# Patient Record
Sex: MALE | Race: WHITE | NOT HISPANIC OR LATINO | Employment: FULL TIME | URBAN - METROPOLITAN AREA
[De-identification: names, ages, dates, MRNs, and addresses within clinical notes are randomized per-mention and may not be internally consistent; named-entity substitution may affect disease eponyms.]

---

## 2017-06-20 ENCOUNTER — GENERIC CONVERSION - ENCOUNTER (OUTPATIENT)
Dept: OTHER | Facility: OTHER | Age: 57
End: 2017-06-20

## 2018-01-13 NOTE — PROGRESS NOTES
Assessment    1  Encounter for preventive health examination (V70 0) (Z00 00)    Plan  Colon cancer screening    · COLONOSCOPY; Status:Active - Retrospective By Protocol Authorization; Requested  ZFR:[id number]; Health Maintenance, Obesity, Screening for prostate cancer    · (1) GLUCOSE,  FASTING; Status:Active; Requested ATX:[id number];    · (1) LIPID PANEL, FASTING; Status:Active; Requested JOSEP:[id number];    · (1) PSA (SCREEN) (Dx V76 44 Screen for Prostate Cancer); Status:Active; Requested  IGB:[id number];    · (1) TSH; Status:Active; Requested RGZ:[id number];     Discussion/Summary  Impression: health maintenance visit  Currently, he eats an adequate diet and has an adequate exercise regimen  Prostate cancer screening: PSA was ordered  Colorectal cancer screening: colorectal cancer screening is current  Screening lab work includes glucose, lipid profile and TSH  The risks and benefits of immunizations were discussed  He was advised to be evaluated by an ophthalmologist  Advice and education were given regarding nutrition, aerobic exercise, weight loss and cardiovascular risk reduction  Repeat labs  he is scheduled for eye exam this month  History of Present Illness  HM, Adult Male: The patient is being seen for a health maintenance evaluation  The last health maintenance visit was >1 year(s) ago  Social History: Household members include spouse, 1 daughter(s) and 1 son(s)  He is   Work status: working full time  The patient has never smoked cigarettes  He reports occasional alcohol use  General Health: The patient's health since the last visit is described as good  He has regular dental visits  He denies vision problems  Vision care includes wearing reading glasses and an eye examination more than a year ago  Immunizations status: up to date  Lifestyle:  He consumes a diverse and healthy diet  He has weight concerns  He exercises regularly  He exercises 3 or more times per week  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, basketball and lacrosse  He does not use tobacco    Screening: Prostate cancer screening includes last prostate-specific antigen testing 03/2014  Colorectal cancer screening includes last colonoscopy done 06/2014  Metabolic screening includes lipid profile performed 12/2012, glucose screening performed 12/2012 and thyroid function test performed 12/2012  Cardiovascular risk factors: obesity  HPI: 48 yo male here for wellness exam  active problems and PMH see chart  medications none  OTCs MVI and ASA  labs 12/2012 profile normal  FBS 96, creatinine 0 85  Electrolytes normal  LFTs normal  Lipid profile cholesterol 188  TGs 48, HDL 70   TSH 2 794  Review of Systems    Constitutional: BMI 31 32, but no recent weight gain and no recent weight loss  Eyes: no eyesight problems  ENT: allergic rhinitis RAST positive to ragweed  methacholine challenge test normal    Cardiovascular: no chest pain, no palpitations and no extremity edema  Respiratory: no shortness of breath during exertion and no PND  Gastrointestinal: no rectal bleeding  GI evaluation for IBS with colonoscopy 03/2005 normal  05/2005 EGD normal  follow up colonoscopy 06/2014 normal  1 hyperplastic polyp, but no abdominal pain, no nausea, no constipation and no diarrhea  Genitourinary: history of abnormal area prostate  prostate biopsy 03/2005 negative  PSA 03/2014 1 2, but no urinary hesitancy and no nocturia  Musculoskeletal: hallus rigidus bilateral-s/p titanium implant left great toe  1998 right ACL and MM tears-treated non surgically  , but no arthralgias and no myalgias  Integumentary: dermatology evaluation 2006 , but no rashes and no skin lesions  Neurological: no headache and no dizziness  Psychiatric: no anxiety and no depression  Active Problems    1  Allergic rhinitis (477 9) (J30 9)   2  Chest pain (786 50) (R07 9)   3   Esophageal reflux (180 81) (K21 9)   4  Irritable bowel syndrome (564 1) (K58 9)   5  Nodular prostate without lower urinary tract symptoms (600 10) (N40 2)    Past Medical History    · History of Acute Medial Meniscus Tear (836 0)   · History of Benign colon polyp (211 3) (K63 5)   · History of Complete Tear Of The Anterior Cruciate Ligament Of The Right Knee (844 2)   · History of gastritis (V12 79) (Z87 19)    Surgical History    · History of Foot Surgery Left    Family History  Father    · Family history of Hypertension (V17 49)    Social History    · Never A Smoker    Current Meds   1  Aspirin 81 MG TABS Recorded   2  Daily Value Multivitamin Oral Tablet Recorded    Allergies    1  No Known Drug Allergies    Vitals   Recorded: [id number] 02:11PM   Temperature 98 4 F   Heart Rate 74   Respiration 16   Systolic 269   Diastolic 80   Height 5 ft 7 in   Weight 200 lb    BMI Calculated 31 32   BSA Calculated 2 02     Physical Exam    Constitutional   General appearance: No acute distress, well appearing and well nourished  Eyes   Conjunctiva and lids: No erythema, swelling or discharge  Pupils and irises: Equal, round, reactive to light  Ophthalmoscopic examination: Normal fundi and optic discs  Ears, Nose, Mouth, and Throat   Otoscopic examination: Tympanic membranes translucent with normal light reflex  Canals patent without erythema  Oropharynx: Normal with no erythema, edema, exudate or lesions  Neck   Neck: Supple, symmetric, trachea midline, no masses  Thyroid: Normal, no thyromegaly  There were no thyroid nodules  Pulmonary   Auscultation of lungs: Clear to auscultation  Cardiovascular   Auscultation of heart: Normal rate and rhythm, normal S1 and S2, no murmurs  Heart sounds: no gallop heard  Carotid pulses: 2+ bilaterally  no bruit heard over the right carotid and no bruit heard over the left carotid  Abdominal aorta: Normal   Abdominal aorta: no bruit heard     Examination of extremities for edema and/or varicosities: Normal     Abdomen   Abdomen: Non-tender, no masses  Liver and spleen: No hepatomegaly or splenomegaly  Lymphatic   Palpation of lymph nodes in neck: No lymphadenopathy  no anterior cervical node enlargement, no posterior cervical node enlargement, no submandibular node enlargement and no supraclavicular node enlargement  Musculoskeletal   Gait and station: Normal     Range of motion: Normal     Muscle strength/tone: Normal     Skin   Skin and subcutaneous tissue: Normal without rashes or lesions  Neurologic   Cranial nerves: Cranial nerves 2-12 intact  Psychiatric   Mood and affect: Normal        Results/Data  (1) PSA (SCREEN) (Dx V76 44 Screen for Prostate Cancer) [id number] 11:36AM Frida Shorten     Test Name Result Flag Reference   PSA 1 2 ng/mL   0-4 0   PSA values from one laboratory may not be comparable to those from  another because of the various testing technologies employed  Additionally, PSA results can not be interpreted as absolute evidence of  the presence or absence of disease  This PSA value was obtained by [employer] Immuno[employer]         Signatures   Electronically signed by : Lucetta Bosworth, M D ; Jun 6 2016  2:43PM EST                       (Author)

## 2018-06-26 RX ORDER — ERGOCALCIFEROL (VITAMIN D2) 10 MCG
1 TABLET ORAL DAILY
COMMUNITY

## 2018-06-27 ENCOUNTER — TELEPHONE (OUTPATIENT)
Dept: FAMILY MEDICINE CLINIC | Facility: CLINIC | Age: 58
End: 2018-06-27

## 2018-06-27 ENCOUNTER — OFFICE VISIT (OUTPATIENT)
Dept: FAMILY MEDICINE CLINIC | Facility: CLINIC | Age: 58
End: 2018-06-27
Payer: COMMERCIAL

## 2018-06-27 VITALS
SYSTOLIC BLOOD PRESSURE: 120 MMHG | HEIGHT: 67 IN | HEART RATE: 64 BPM | TEMPERATURE: 98.7 F | BODY MASS INDEX: 31.71 KG/M2 | WEIGHT: 202 LBS | DIASTOLIC BLOOD PRESSURE: 88 MMHG

## 2018-06-27 DIAGNOSIS — Z12.5 SCREENING FOR PROSTATE CANCER: ICD-10-CM

## 2018-06-27 DIAGNOSIS — Z00.00 WELL ADULT EXAM: Primary | ICD-10-CM

## 2018-06-27 DIAGNOSIS — Z11.59 NEED FOR HEPATITIS C SCREENING TEST: ICD-10-CM

## 2018-06-27 DIAGNOSIS — Z23 NEED FOR SHINGLES VACCINE: ICD-10-CM

## 2018-06-27 DIAGNOSIS — E78.00 HYPERCHOLESTEREMIA: ICD-10-CM

## 2018-06-27 PROCEDURE — 99396 PREV VISIT EST AGE 40-64: CPT | Performed by: FAMILY MEDICINE

## 2018-06-27 NOTE — PROGRESS NOTES
Assessment/Plan:     Diagnoses and all orders for this visit:    Well adult exam    Hypercholesteremia  -     Lipid panel    Screening for prostate cancer  -     PSA, Total Screen; Future    Need for shingles vaccine  -     Zoster Vac Recomb Adjuvanted 50 MCG SUSR; Inject 50 mcg into the shoulder, thigh, or buttocks once for 1 dose    Need for hepatitis C screening test  -     Hepatitis C antibody; Future        Diet, weight loss and exercise  Repeat lipid profile  I included a PSA and Hep C AB  Script for shingles vaccine  Patient ID: Niecy Rea is a 66 y o  male  66year old here for wellness exam  Active problems and PMH see chart  Medications none  OTCs ASA 81 mg daily, MVI and   Glucosamine / Chondroitin  Hyperlipidemia with high HDL cholesterol  06/2016 lipid profile cholesterol 216  Triglycerides 90  HDL 70    FBS 96  TSH 3 210  Physically active  Officiates soccer, basketball and lacrosse    2 children  The following portions of the patient's history were reviewed and updated as appropriate: current medications, past family history, past medical history, past social history, past surgical history and problem list       No Known Allergies    Review of Systems   Constitutional: Negative for appetite change, chills, fever and unexpected weight change  HENT: Negative for congestion, ear pain, rhinorrhea, sore throat and trouble swallowing  Eyes: Negative for visual disturbance  Wears reading glasses  Respiratory: Negative for cough, shortness of breath and wheezing  Cardiovascular: Negative for chest pain, palpitations and leg swelling  Gastrointestinal: Negative for abdominal pain, blood in stool, constipation, diarrhea, nausea and vomiting  GI evaluation for IBS with colonoscopy 03/2005 normal  05/2005 EGD normal  follow up colonoscopy 06/2014 normal  1 hyperplastic polyp  Genitourinary: Negative for difficulty urinating          History of abnormal area prostate  prostate biopsy 03/2005 negative  PSA 03/2014 1 2  06/2016 PSA 1 5   Musculoskeletal: Negative for arthralgias and myalgias  Hallus rigidus bilateral-s/p titanium implant left great toe  1998 right ACL and MM tears-treated non surgically   Skin: Negative for rash  Allergic/Immunologic: Positive for environmental allergies  Allergic rhinitis RAST positive to ragweed  methacholine challenge test normal     Neurological: Negative for dizziness, weakness and headaches  Hematological: Negative for adenopathy  Does not bruise/bleed easily  Psychiatric/Behavioral: Negative for dysphoric mood and sleep disturbance  Objective:      /88 (BP Location: Left arm, Patient Position: Sitting, Cuff Size: Large)   Pulse 64   Temp 98 7 °F (37 1 °C)   Ht 5' 7" (1 702 m)   Wt 91 6 kg (202 lb)   BMI 31 64 kg/m²          Physical Exam   Constitutional: He is oriented to person, place, and time  He appears well-developed and well-nourished  No distress  HENT:   Right Ear: Tympanic membrane normal    Left Ear: Tympanic membrane normal    Mouth/Throat: Oropharynx is clear and moist    Eyes: Conjunctivae and EOM are normal  Pupils are equal, round, and reactive to light  No scleral icterus  Funduscopic exam normal   Neck: Normal range of motion  Neck supple  No JVD present  Carotid bruit is not present  No tracheal deviation present  No thyroid mass and no thyromegaly present  Cardiovascular: Normal rate, regular rhythm, normal heart sounds and intact distal pulses  Exam reveals no gallop  No murmur heard  Pulses:       Carotid pulses are 2+ on the right side, and 2+ on the left side  Pulmonary/Chest: Effort normal and breath sounds normal  No respiratory distress  He has no wheezes  He has no rales  Abdominal: Soft  Bowel sounds are normal  He exhibits no distension and no mass  There is no hepatosplenomegaly  There is no tenderness   There is no rebound and no guarding  Musculoskeletal: Normal range of motion  He exhibits no edema or deformity  Lymphadenopathy:     He has no cervical adenopathy  Neurological: He is alert and oriented to person, place, and time  He has normal reflexes  No cranial nerve deficit  Skin: Skin is warm and dry  No rash noted  Psychiatric: He has a normal mood and affect  His behavior is normal    Nursing note and vitals reviewed

## 2018-09-27 ENCOUNTER — APPOINTMENT (OUTPATIENT)
Dept: LAB | Facility: CLINIC | Age: 58
End: 2018-09-27
Payer: COMMERCIAL

## 2018-09-27 DIAGNOSIS — Z12.5 SCREENING FOR PROSTATE CANCER: ICD-10-CM

## 2018-09-27 DIAGNOSIS — Z11.59 NEED FOR HEPATITIS C SCREENING TEST: ICD-10-CM

## 2018-09-27 LAB
CHOLEST SERPL-MCNC: 182 MG/DL (ref 50–200)
HCV AB SER QL: NORMAL
HDLC SERPL-MCNC: 64 MG/DL (ref 40–60)
LDLC SERPL CALC-MCNC: 102 MG/DL (ref 0–100)
NONHDLC SERPL-MCNC: 118 MG/DL
PSA SERPL-MCNC: 1.6 NG/ML (ref 0–4)
TRIGL SERPL-MCNC: 81 MG/DL

## 2018-09-27 PROCEDURE — 80061 LIPID PANEL: CPT | Performed by: FAMILY MEDICINE

## 2018-09-27 PROCEDURE — G0103 PSA SCREENING: HCPCS

## 2018-09-27 PROCEDURE — 36415 COLL VENOUS BLD VENIPUNCTURE: CPT | Performed by: FAMILY MEDICINE

## 2018-09-27 PROCEDURE — 86803 HEPATITIS C AB TEST: CPT

## 2019-07-10 ENCOUNTER — OFFICE VISIT (OUTPATIENT)
Dept: FAMILY MEDICINE CLINIC | Facility: CLINIC | Age: 59
End: 2019-07-10
Payer: COMMERCIAL

## 2019-07-10 VITALS
DIASTOLIC BLOOD PRESSURE: 84 MMHG | WEIGHT: 204 LBS | SYSTOLIC BLOOD PRESSURE: 118 MMHG | HEART RATE: 66 BPM | TEMPERATURE: 97.7 F | RESPIRATION RATE: 16 BRPM | BODY MASS INDEX: 30.92 KG/M2 | HEIGHT: 68 IN

## 2019-07-10 DIAGNOSIS — Z00.00 ANNUAL PHYSICAL EXAM: Primary | ICD-10-CM

## 2019-07-10 DIAGNOSIS — Z12.11 SCREENING FOR MALIGNANT NEOPLASM OF COLON: ICD-10-CM

## 2019-07-10 PROCEDURE — 99396 PREV VISIT EST AGE 40-64: CPT | Performed by: NURSE PRACTITIONER

## 2019-07-10 NOTE — PATIENT INSTRUCTIONS

## 2019-07-10 NOTE — PROGRESS NOTES
ADULT ANNUAL PHYSICAL  [facility]    NAME: Loren Bosch  AGE: 64 y o  SEX: male  : 1960     DATE: 7/10/2019     Assessment and Plan:     Problem List Items Addressed This Visit     None      Visit Diagnoses     Annual physical exam    -  Primary    Screening for malignant neoplasm of colon        Relevant Orders    Ambulatory referral to Colorectal Surgery          Immunizations and preventive care screenings were discussed with patient today  Appropriate education was printed on patient's after visit summary  Counseling:  BMI Counseling: Body mass index is 30 93 kg/m²  Discussed the patient's BMI with him  The BMI is above average  BMI counseling and education was provided to the patient  Nutrition recommendations include reducing portion sizes, decreasing overall calorie intake, consuming healthier snacks, moderation in carbohydrate intake and reducing intake of saturated fat and trans fat  Exercise recommendations include exercising 3-5 times per week  Alcohol/drug use: discussed moderation in alcohol intake and avoidance of illicit drug use  Dental Health: discussed importance of regular tooth brushing, flossing, and dental visits  Injury prevention: discussed safety/seat belts, safety helmets, smoke detectors, carbon dioxide detectors, and smoking near bedding or upholstery  · Sexual health: discussed sexually transmitted diseases, partner selection, use of condoms, avoidance of unintended pregnancy, and contraceptive alternatives  Return in 1 year (on 7/10/2020)  Chief Complaint:     Chief Complaint   Patient presents with    Well Check      History of Present Illness:     Adult Annual Physical   Patient here for a comprehensive physical exam  The patient reports no problems  Diet and Physical Activity  · Diet/Nutrition: well balanced diet and consuming 3-5 servings of fruits/vegetables daily     · Exercise: 5-7 times a week on average and 30-60 minutes on average  Depression Screening  PHQ-9 Depression Screening    PHQ-9:    Frequency of the following problems over the past two weeks:       Little interest or pleasure in doing things:  0 - not at all  Feeling down, depressed, or hopeless:  0 - not at all  PHQ-2 Score:  0       General Health  · Sleep: sleeps well and gets 7-8 hours of sleep on average  · Hearing: normal - bilateral   · Vision: no vision problems  · Dental: regular dental visits, brushes teeth once daily and flosses teeth occasionally   Health  · Symptoms include: none     Review of Systems:     Review of Systems   Constitutional: Negative  HENT: Negative  Eyes: Negative  Respiratory: Negative  Cardiovascular: Negative  Gastrointestinal: Negative  Endocrine: Negative  Genitourinary: Negative  Musculoskeletal: Negative  Skin: Negative  Allergic/Immunologic: Negative  Neurological: Negative  Hematological: Negative  Psychiatric/Behavioral: Negative         Past Medical History:     Past Medical History:   Diagnosis Date    Benign colon polyp     last assessed 06Jun2016    Complete tear of anterior cruciate ligament of right knee     Gastritis       Past Surgical History:     Past Surgical History:   Procedure Laterality Date    FOOT SURGERY Left       Family History:     Family History   Problem Relation Age of Onset    Hypertension Father       Social History:     Social History     Socioeconomic History    Marital status: /Civil Union     Spouse name: None    Number of children: None    Years of education: None    Highest education level: None   Occupational History    None   Social Needs    Financial resource strain: None    Food insecurity:     Worry: None     Inability: None    Transportation needs:     Medical: None     Non-medical: None   Tobacco Use    Smoking status: Never Smoker    Smokeless tobacco: Never Used   Substance and Sexual Activity    Alcohol use: Yes     Comment: socially    Drug use: Never    Sexual activity: None   Lifestyle    Physical activity:     Days per week: None     Minutes per session: None    Stress: None   Relationships    Social connections:     Talks on phone: None     Gets together: None     Attends [employer] service: None     Active member of club or organization: None     Attends meetings of clubs or organizations: None     Relationship status: None    Intimate partner violence:     Fear of current or ex partner: None     Emotionally abused: None     Physically abused: None     Forced sexual activity: None   Other Topics Concern    None   Social History Narrative    None      Current Medications:     Current Outpatient Medications   Medication Sig Dispense Refill    aspirin 81 MG tablet Take 1 tablet by mouth daily      Multiple Vitamin (DAILY VALUE MULTIVITAMIN) TABS Take 1 tablet by mouth daily       No current facility-administered medications for this visit  Allergies:     No Known Allergies   Physical Exam:     /84   Pulse 66   Temp 97 7 °F (36 5 °C)   Resp 16   Ht 5' 8 1" (1 73 m)   Wt 92 5 kg (204 lb)   BMI 30 93 kg/m² (Reviewed)    Physical Exam   Constitutional: He is oriented to person, place, and time  Vital signs are normal  He appears well-developed and well-nourished  HENT:   Head: Normocephalic and atraumatic  Right Ear: Tympanic membrane, external ear and ear canal normal    Left Ear: Tympanic membrane, external ear and ear canal normal    Nose: Nose normal    Mouth/Throat: Uvula is midline, oropharynx is clear and moist and mucous membranes are normal    Eyes: Pupils are equal, round, and reactive to light  Conjunctivae, EOM and lids are normal    Neck: Trachea normal  Neck supple  Cardiovascular: Normal rate, regular rhythm, normal heart sounds and intact distal pulses  Pulmonary/Chest: Effort normal and breath sounds normal    Abdominal: Soft   Bowel sounds are normal  There is no tenderness  Musculoskeletal: Normal range of motion  Neurological: He is alert and oriented to person, place, and time  He has normal strength and normal reflexes  No cranial nerve deficit or sensory deficit  He displays a negative Romberg sign  Skin: Skin is warm and dry  Capillary refill takes less than 2 seconds  Psychiatric: He has a normal mood and affect   His behavior is normal        Lindsey Araya, [id number] Muriel Joseph

## 2019-10-07 ENCOUNTER — OFFICE VISIT (OUTPATIENT)
Dept: FAMILY MEDICINE CLINIC | Facility: CLINIC | Age: 59
End: 2019-10-07
Payer: COMMERCIAL

## 2019-10-07 VITALS
HEART RATE: 72 BPM | SYSTOLIC BLOOD PRESSURE: 122 MMHG | BODY MASS INDEX: 31.52 KG/M2 | WEIGHT: 208 LBS | DIASTOLIC BLOOD PRESSURE: 70 MMHG | RESPIRATION RATE: 16 BRPM | TEMPERATURE: 97.7 F | HEIGHT: 68 IN

## 2019-10-07 DIAGNOSIS — M54.16 LUMBAR RADICULOPATHY: Primary | ICD-10-CM

## 2019-10-07 PROCEDURE — 3008F BODY MASS INDEX DOCD: CPT | Performed by: FAMILY MEDICINE

## 2019-10-07 PROCEDURE — 99213 OFFICE O/P EST LOW 20 MIN: CPT | Performed by: FAMILY MEDICINE

## 2019-10-07 RX ORDER — METHYLPREDNISOLONE 4 MG/1
TABLET ORAL
Qty: 21 EACH | Refills: 0 | Status: SHIPPED | OUTPATIENT
Start: 2019-10-07 | End: 2020-10-12 | Stop reason: ALTCHOICE

## 2019-10-07 NOTE — PROGRESS NOTES
Assessment/Plan:       Diagnoses and all orders for this visit:    Lumbar radiculopathy  -     methylPREDNISolone 4 MG tablet therapy pack; Use as directed on package  -     Ambulatory referral to Physical Therapy; Future    Other orders  -     Cancel: influenza vaccine, [id number], quadrivalent, recombinant, PF, 0 5 mL, for patients 18 yr+ (FLUBLOK)        Trial of oral steroids  Referral for PT  Advised to call if any changes  Patient ID: Severiano Lazier is a 64 y o  male  1 month history of pain right gluteal area radiating into right lateral calf  Burning sensation right thigh  Numbness and tingling right lateral foot  No leg weakness  No new bowel or bladder changes  Symptoms worse with sitting  He has tried prn Advil for pain  He has had several chiropractic treatments  No prior history of similar problem  The following portions of the patient's history were reviewed and updated as appropriate: allergies, current medications, past family history, past medical history, past social history, past surgical history and problem list     Review of Systems   Constitutional: Negative for appetite change, chills and fever  Gastrointestinal: Negative for abdominal pain, constipation, diarrhea, nausea and vomiting  Genitourinary: Negative for difficulty urinating  Musculoskeletal: Negative for back pain and gait problem  Skin: Negative for rash  Neurological: Negative for weakness and headaches  See HPI          Objective:       /70   Pulse 72   Temp 97 7 °F (36 5 °C)   Resp 16   Ht 5' 8 1" (1 73 m)   Wt 94 3 kg (208 lb)   BMI 31 53 kg/m²          Physical Exam   Constitutional: He is oriented to person, place, and time  No distress  Musculoskeletal: Normal range of motion  He exhibits tenderness  He exhibits no edema  Mild tenderness over right gluteal notch  No lumbar spine or lumbar paraspinal tenderness  No SI tenderness      Neurological: He is alert and oriented to person, place, and time  He has normal strength  No sensory deficit  Gait normal    Reflex Scores:       Patellar reflexes are 2+ on the right side and 2+ on the left side  Achilles reflexes are 0 on the right side and 2+ on the left side    Negative SLR

## 2019-10-15 ENCOUNTER — EVALUATION (OUTPATIENT)
Dept: PHYSICAL THERAPY | Facility: CLINIC | Age: 59
End: 2019-10-15
Payer: COMMERCIAL

## 2019-10-15 DIAGNOSIS — M54.16 LUMBAR RADICULOPATHY: Primary | ICD-10-CM

## 2019-10-15 PROCEDURE — 97161 PT EVAL LOW COMPLEX 20 MIN: CPT | Performed by: PHYSICAL THERAPIST

## 2019-10-15 NOTE — PROGRESS NOTES
PT Evaluation     Today's date: 10/15/2019  Patient name: Nancy Sykes  : 1960  MRN: [id number]  Referring provider: Deni Cortez MD  Dx:   Encounter Diagnosis     ICD-10-CM    1  Lumbar radiculopathy M54 16 Ambulatory referral to Physical Therapy                  Assessment  Assessment details: Nancy ySkes is a 64 y o  male who presents with signs and symptoms consistent with their referring diagnosis of Lumbar radiculopathy  (primary encounter diagnosis)  This patient would benefit from skilled physical therapy to address their listed impairments and functional limitations to maximize functional outcome  Impairments: abnormal or restricted ROM, activity intolerance and lacks appropriate home exercise program     Prognosis: good    Goals  STG Patient is independent with HEP   STG Range of motion is improved by 25% in 2 weeks  LTG Work performance is improved to prior level of function in 4 weeks  LTG ADL performance improved to prior level of function in 4 weeks    Plan  Patient would benefit from: skilled physical therapy  Planned therapy interventions: manual therapy, joint mobilization, abdominal trunk stabilization, flexibility, functional ROM exercises, graded activity, graded exercise, home exercise program and therapeutic exercise  Frequency: 2x week  Duration in visits: 8  Duration in weeks: 4  Treatment plan discussed with: patient        Subjective Evaluation    History of Present Illness  Mechanism of injury: Patient reports onset pulsations in his hamstring and calf on 19  The next day he had stabbing/cramping pain in his right calf that lasted a week  He now has constant partial numbness from the right buttocks down the right side of the leg to the 5th toe  He also has tingling in the left anteromedial lower leg that is intermittent  Symptoms gradually increase with sitting, which he does a lot of at work  He is taking oral steroids which has not helped yet      Pain  At worst pain ratin  Quality: dull ache, radiating and burning    Patient Goals  Patient goals for therapy: decreased pain and independence with ADLs/IADLs          Objective     Palpation   Left   No palpable tenderness to the erector spinae and lumbar paraspinals  Right   No palpable tenderness to the erector spinae and lumbar paraspinals  Neurological Testing     Reflexes   Left   Patellar (L4): absent (0)  Achilles (S1): normal (2+)    Right   Patellar (L4): absent (0)  Achilles (S1): trace (1+)    Active Range of Motion     Lumbar   Flexion: 60 degrees  with pain  Extension: 32 degrees   Left lateral flexion:  WFL  Right lateral flexion:  [facility]    Additional Active Range of Motion Details  Shooting pain down posterolateral leg with standing flexion    Joint Play   Joints within functional limits: L3, L4 and L5     Hypomobile: L1 and L2     Strength/Myotome Testing     Left Ankle/Foot   Plantar flexion: 5    Right Ankle/Foot   Plantar flexion: 5    Tests     Lumbar   Negative SIJ compression and sacroiliac distraction  Left   Negative passive SLR and slump test      Right   Positive slump test    Negative passive SLR and quadrant       General Comments:      Lumbar Comments  Tender to palpation: right piriformis  No tenderness to low back including PA mobs to right L4-5 region           Precautions: hx right knee pain/strain/ACL injury      Manual  10/15            Piriformis DTM SY            Nerve slides, S1                                                        Exercise Diary  10/15            Prone press ups 20            Prone alt arm a and leg             DLS kicks 10 with biofeedback            bridges             Piriformis stretch :20x3            Glut stretch :20x3            Hamstring glides 15            Resisted hip ER supine             Hip add squeeze             Hip flexor stretch             Toe taps             Hamstring/ITB strap stretching supine Modalities

## 2019-10-17 ENCOUNTER — OFFICE VISIT (OUTPATIENT)
Dept: PHYSICAL THERAPY | Facility: CLINIC | Age: 59
End: 2019-10-17
Payer: COMMERCIAL

## 2019-10-17 DIAGNOSIS — M54.16 LUMBAR RADICULOPATHY: Primary | ICD-10-CM

## 2019-10-17 PROCEDURE — 97110 THERAPEUTIC EXERCISES: CPT | Performed by: PHYSICAL THERAPIST

## 2019-10-17 PROCEDURE — 97140 MANUAL THERAPY 1/> REGIONS: CPT | Performed by: PHYSICAL THERAPIST

## 2019-10-17 NOTE — PROGRESS NOTES
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      Objective: See treatment diary below      Assessment: Tolerated treatment well  Patient exhibited good technique with therapeutic exercises and would benefit from continued PT  Tender to right piriformis but otherwise no tenderness to low back, superior gluteals or hamstring  Plan: Continue per plan of care        Precautions: hx right knee pain/strain/ACL injury      Manual  10/15 10/17           Piriformis DTM SY SY           Nerve slides, S1                                                        Exercise Diary  10/15 10/17           Prone press ups 20 20           Prone alt arm a and leg             DLS kicks 10 with biofeedback            bridges  pball 20, legs mostly extended           Piriformis stretch :20x3 :20x3           Glut stretch :20x3 :20x3           Hamstring glides 15 15           Resisted hip ER supine  :05x15           Hip add squeeze  :05x15           Hip flexor stretch  Equal, no stretch           Toe taps  2x20           Hamstring/ITB strap stretching supine  ITB :20x3           Multifidus press    #17 15ea                                                                                                          Modalities

## 2019-10-21 ENCOUNTER — OFFICE VISIT (OUTPATIENT)
Dept: PHYSICAL THERAPY | Facility: CLINIC | Age: 59
End: 2019-10-21
Payer: COMMERCIAL

## 2019-10-21 DIAGNOSIS — M54.16 LUMBAR RADICULOPATHY: Primary | ICD-10-CM

## 2019-10-21 PROCEDURE — 97140 MANUAL THERAPY 1/> REGIONS: CPT

## 2019-10-21 PROCEDURE — 97112 NEUROMUSCULAR REEDUCATION: CPT

## 2019-10-21 PROCEDURE — 97110 THERAPEUTIC EXERCISES: CPT

## 2019-10-21 NOTE — PROGRESS NOTES
Daily Note     Today's date: 10/21/2019  Patient name: Meghan Issa  : 1960  MRN: [id number]  Referring provider: Delmar Martínez MD  Dx:   Encounter Diagnosis     ICD-10-CM    1  Lumbar radiculopathy M54 16                   Subjective: Increased soreness in R gluts after last session, no change in RLE numbness  Objective: See treatment diary below      Assessment: Tolerated treatment well  Minimal tenderness to R piriformis with manual therapy  Challenged by multifidus press on raven though no increase in symptoms throughout session  Patient exhibited good technique with therapeutic exercises and would benefit from continued PT      Plan: Progress treatment as tolerated         Precautions: hx right knee pain/strain/ACL injury      Manual  10/15 10/17 10/21          Piriformis DTM SY SY MC          Nerve slides, S1                                                        Exercise Diary  10/15 10/17 10/21          Prone press ups 20 20 20          Prone alt arm a and leg             DLS kicks 10 with biofeedback            bridges  pball 20, legs mostly extended pball 20, legs mostly extended          Piriformis stretch :20x3 :20x3 :20x4          Glut stretch :20x3 :20x3 :20x4          Hamstring glides 15 15 20          Resisted hip ER supine  :05x15 :05x20          Hip add squeeze  :05x15 :05x20          Hip flexor stretch  Equal, no stretch           Toe taps  2x20           Hamstring/ITB strap stretching supine  ITB :20x3 Both :20x3 ea          Multifidus press    #17 15ea 17# 15 ea                                                                                                         Modalities

## 2019-10-22 ENCOUNTER — APPOINTMENT (OUTPATIENT)
Dept: PHYSICAL THERAPY | Facility: CLINIC | Age: 59
End: 2019-10-22
Payer: COMMERCIAL

## 2019-10-24 ENCOUNTER — OFFICE VISIT (OUTPATIENT)
Dept: PHYSICAL THERAPY | Facility: CLINIC | Age: 59
End: 2019-10-24
Payer: COMMERCIAL

## 2019-10-24 DIAGNOSIS — M54.16 LUMBAR RADICULOPATHY: Primary | ICD-10-CM

## 2019-10-24 PROCEDURE — 97112 NEUROMUSCULAR REEDUCATION: CPT | Performed by: PHYSICAL THERAPIST

## 2019-10-24 PROCEDURE — 97140 MANUAL THERAPY 1/> REGIONS: CPT | Performed by: PHYSICAL THERAPIST

## 2019-10-24 PROCEDURE — 97110 THERAPEUTIC EXERCISES: CPT | Performed by: PHYSICAL THERAPIST

## 2019-10-24 NOTE — PROGRESS NOTES
Daily Note     Today's date: 10/24/2019  Patient name: Selin Foster  : 1960  MRN: [id number]  Referring provider: Holly Aggarwal MD  Dx:   Encounter Diagnosis     ICD-10-CM    1  Lumbar radiculopathy M54 16               Subjective: no change in symptoms with continued numbness and buttock to calf pain      Objective: See treatment diary below      Assessment: Tolerated treatment well  Patient exhibited good technique with therapeutic exercises and would benefit from continued PT  There is a significant difference in tenderness reported in the piriformis muscles > right  He is unable to pull perform the piriformis stretch supine on the right without a towel - no difficulty on the left without towel  Plan: Progress treatment as tolerated         Precautions: hx right knee pain/strain/ACL injury      Manual  10/15 10/17 10/21 10/24         Piriformis DTM, right SY SY MC SY         Nerve slides, S1    SY                                                    Exercise Diary  10/15 10/17 10/21 10/24         Prone press ups 20 20 20          Prone alt arm a and leg             DLS kicks 10 with biofeedback            bridges  pball 20, legs mostly extended pball 20, legs mostly extended          Piriformis stretch :20x3 :20x3 :20x4 :20x4         Glut stretch :20x3 :20x3 :20x4          Hamstring glides 15 15 20 20         Resisted hip ER supine  :05x15 :05x20          Hip add squeeze  :05x15 :05x20          Hip flexor stretch  Equal, no stretch           Toe taps  2x20           Hamstring/ITB strap stretching supine  ITB :20x3 Both :20x3 ea          Multifidus press    #17 15ea 17# 15 ea          Clamshells     :05x15         Right Hip abduction sidelying starting from hip flexion and adduction flexion    20         Slump stretch    :10x10                      Seated piriformis stretch    :20x4                                       Modalities

## 2019-10-29 ENCOUNTER — APPOINTMENT (OUTPATIENT)
Dept: PHYSICAL THERAPY | Facility: CLINIC | Age: 59
End: 2019-10-29
Payer: COMMERCIAL

## 2019-11-13 ENCOUNTER — TELEPHONE (OUTPATIENT)
Dept: FAMILY MEDICINE CLINIC | Facility: CLINIC | Age: 59
End: 2019-11-13

## 2019-11-13 NOTE — TELEPHONE ENCOUNTER
Patient called with update on back pain, tingling & numbness  He completed his 6 weeks of PT and is still experiencing back pain, tingling down the right side & numbness down the left  He would like to go for MRI  Can this be ordered for him?     Betsy Preciado [phone]

## 2019-11-14 DIAGNOSIS — M54.16 LUMBAR RADICULOPATHY: Primary | ICD-10-CM

## 2019-11-15 NOTE — TELEPHONE ENCOUNTER
Gave patient the message  Told him to call back with date and location so we can work on authorization

## 2019-11-15 NOTE — TELEPHONE ENCOUNTER
Patient is scheduled for his MRI on 11/26/19 at [facility]   He will need an insurance authorization

## 2019-11-19 NOTE — TELEPHONE ENCOUNTER
Left message for patient to call back   His MRI has been approved, however he will be receiving a call from [employer] to discuss scheduling  He needs to speak to them and tell them he wants to keep the facility before I will be issued an auth number

## 2019-11-26 ENCOUNTER — HOSPITAL ENCOUNTER (OUTPATIENT)
Dept: MRI IMAGING | Facility: HOSPITAL | Age: 59
Discharge: HOME | End: 2019-11-26
Payer: COMMERCIAL

## 2019-11-26 DIAGNOSIS — M54.16 LUMBAR RADICULOPATHY: ICD-10-CM

## 2019-11-26 PROCEDURE — 72148 MRI LUMBAR SPINE W/O DYE: CPT

## 2019-12-01 ENCOUNTER — TELEPHONE (OUTPATIENT)
Dept: FAMILY MEDICINE CLINIC | Facility: CLINIC | Age: 59
End: 2019-12-01

## 2019-12-01 NOTE — TELEPHONE ENCOUNTER
Call re MRI lumbar spine-MRI shows arthritis lumbar spine  Bulging disc on right at L5-SI suspect this is the cause of his symptoms  There is a herniated disc on the left but his symptoms were in the right leg  Plan-I recommend pain management evaluation     Procedure: Mri Lumbar Spine Wo Contrast    Result Date: 11/29/2019  Narrative: MRI LUMBAR SPINE WITHOUT CONTRAST INDICATION: M54 16: Radiculopathy, lumbar region  COMPARISON:  None  TECHNIQUE:  Sagittal T1, sagittal T2, sagittal inversion recovery, axial T1 and axial T2, coronal T2   IMAGE QUALITY:  Diagnostic FINDINGS: VERTEBRAL BODIES:  There are 5 lumbar type vertebral bodies  There is Modic type II endplate degenerative change at L1-L2  There is no suspicious marrow signal abnormality identified  There is no significant spondylolisthesis  There is mild levo tilt  There is Modic type I endplate degenerative change at L4-L5  SACRUM:  Normal signal within the sacrum  No evidence of insufficiency or stress fracture  There is a sacral Tarlov cyst noted  DISTAL CORD AND CONUS:  Normal size and signal within the distal cord and conus  PARASPINAL SOFT TISSUES:  Paraspinal soft tissues are unremarkable  LOWER THORACIC DISC SPACES:  Normal disc height and signal   No disc herniation, canal stenosis or foraminal narrowing  LUMBAR DISC SPACES:  There is multilevel disc space degeneration  There is disc space narrowing at L1-L2, L4-L5 and L5-S1  L1-L2:  There is a bulging annulus  There is no significant canal stenosis or foraminal narrowing  There is minimal mass effect on the thecal sac  L2-L3:  There is facet arthrosis  There is no significant canal stenosis or foraminal narrowing  L3-L4:  There is bilateral facet arthrosis  There is a synovial cyst along the anteromedial aspect of the right facet joint which results in minimal mass effect on the thecal sac  This measures 4 x 8 mm  There is mild bilateral foraminal narrowing   L4-L5:  There is a bulging annulus, asymmetric to the left  There is a small superimposed central disc protrusion with associated annular fissure  There is facet arthrosis  There is moderate to severe left foraminal narrowing with contact of the exiting left L4 nerve root  There is no significant right foraminal narrowing  L5-S1:  There is a bulging annulus, asymmetric to the right  There is endplate spurring  There is moderate right and mild to moderate left foraminal narrowing  Impression: Multilevel degenerative changes of the lumbar spine, as described above  Note is made of a small synovial cyst along the anteromedial aspect of the right facet joint at L3-L4, which results in minimal mass effect on the thecal sac  Moderate to severe left foraminal narrowing at L4-L5 contacts the exiting left L4 nerve root   Workstation performed: [id number]

## 2019-12-02 DIAGNOSIS — M54.16 LUMBAR RADICULOPATHY: Primary | ICD-10-CM

## 2019-12-02 DIAGNOSIS — R93.89 ABNORMAL MRI: ICD-10-CM

## 2019-12-15 ENCOUNTER — OFFICE VISIT (OUTPATIENT)
Dept: URGENT CARE | Facility: MEDICAL CENTER | Age: 59
End: 2019-12-15
Payer: COMMERCIAL

## 2019-12-15 VITALS
WEIGHT: 208.2 LBS | TEMPERATURE: 97.9 F | DIASTOLIC BLOOD PRESSURE: 94 MMHG | SYSTOLIC BLOOD PRESSURE: 152 MMHG | HEART RATE: 62 BPM | BODY MASS INDEX: 32.68 KG/M2 | OXYGEN SATURATION: 97 % | HEIGHT: 67 IN | RESPIRATION RATE: 18 BRPM

## 2019-12-15 DIAGNOSIS — R10.9 FLANK PAIN: Primary | ICD-10-CM

## 2019-12-15 LAB
SL AMB  POCT GLUCOSE, UA: ABNORMAL
SL AMB LEUKOCYTE ESTERASE,UA: ABNORMAL
SL AMB POCT BILIRUBIN,UA: ABNORMAL
SL AMB POCT BLOOD,UA: ABNORMAL
SL AMB POCT CLARITY,UA: CLEAR
SL AMB POCT COLOR,UA: ABNORMAL
SL AMB POCT KETONES,UA: ABNORMAL
SL AMB POCT NITRITE,UA: ABNORMAL
SL AMB POCT PH,UA: 5
SL AMB POCT SPECIFIC GRAVITY,UA: 1.02
SL AMB POCT URINE PROTEIN: ABNORMAL
SL AMB POCT UROBILINOGEN: 0.2

## 2019-12-15 PROCEDURE — G0382 LEV 3 HOSP TYPE B ED VISIT: HCPCS | Performed by: PHYSICIAN ASSISTANT

## 2019-12-15 PROCEDURE — 87086 URINE CULTURE/COLONY COUNT: CPT | Performed by: PHYSICIAN ASSISTANT

## 2019-12-15 PROCEDURE — 81002 URINALYSIS NONAUTO W/O SCOPE: CPT | Performed by: PHYSICIAN ASSISTANT

## 2019-12-15 RX ORDER — TAMSULOSIN HYDROCHLORIDE 0.4 MG/1
0.4 CAPSULE ORAL
Qty: 3 CAPSULE | Refills: 0 | Status: SHIPPED | OUTPATIENT
Start: 2019-12-15 | End: 2020-12-10

## 2019-12-15 RX ORDER — IBUPROFEN 600 MG/1
600 TABLET ORAL EVERY 8 HOURS PRN
Qty: 15 TABLET | Refills: 0 | Status: SHIPPED | OUTPATIENT
Start: 2019-12-15 | End: 2021-01-19 | Stop reason: ALTCHOICE

## 2019-12-15 NOTE — PROGRESS NOTES
[facility]        NAME: Ace Archer is a 64 y o  male  : 1960    MRN: [id number]  DATE: December 15, 2019  TIME: 10:47 AM    Assessment and Plan   Flank pain [R10 9]  1  Flank pain  POCT urine dip    Urine culture    tamsulosin (FLOMAX) 0 4 mg    ibuprofen (MOTRIN) 600 mg tablet         Patient Instructions     1  Take Motrin 600mg  With food every 8 hours as needed for pain   2  Take Flomax  4mg  Daily  3  Increase fluids  4  Proceed to  ER if symptoms worsen  Chief Complaint     Chief Complaint   Patient presents with    Flank Pain     Pt  C/O right flank pain that began two days ago  Pain went away, but returned yesterday afternoon along with nausea and a fever  History of Present Illness       Mary Luong is a 45-year-old male who presents with a 2 day history of right-sided flank pain  Patient reports symptoms started initially as a dull ache but the pain progressed, he currently rates his pain as a 5 on a scale of 1-10  Patient denies any fever but has had nausea and generalized malaise since the onset of his symptoms, he denies any visible blood in his urine or stone passage  Review of Systems   Review of Systems   Constitutional: Negative  Respiratory: Negative  Cardiovascular: Negative  Gastrointestinal: Negative  Genitourinary: Positive for flank pain  Negative for difficulty urinating, dysuria, frequency and hematuria           Current Medications       Current Outpatient Medications:     aspirin 81 MG tablet, Take 1 tablet by mouth daily, Disp: , Rfl:     Multiple Vitamin (DAILY VALUE MULTIVITAMIN) TABS, Take 1 tablet by mouth daily, Disp: , Rfl:     ibuprofen (MOTRIN) 600 mg tablet, Take 1 tablet (600 mg total) by mouth every 8 (eight) hours as needed for mild pain or moderate pain for up to 5 days, Disp: 15 tablet, Rfl: 0    methylPREDNISolone 4 MG tablet therapy pack, Use as directed on package (Patient not taking: Reported on 12/15/2019), Disp: 21 each, Rfl: 0    tamsulosin (FLOMAX) 0 4 mg, Take 1 capsule (0 4 mg total) by mouth daily with dinner for 3 days, Disp: 3 capsule, Rfl: 0    Current Allergies     Allergies as of 12/15/2019    (No Known Allergies)            The following portions of the patient's history were reviewed and updated as appropriate: allergies, current medications, past family history, past medical history, past social history, past surgical history and problem list      Past Medical History:   Diagnosis Date    Benign colon polyp     last assessed [id number]    Complete tear of anterior cruciate ligament of right knee     Gastritis        Past Surgical History:   Procedure Laterality Date    FOOT SURGERY Left        Family History   Problem Relation Age of Onset    Hypertension Father          Medications have been verified  Objective   /94   Pulse 62   Temp 97 9 °F (36 6 °C) (Temporal)   Resp 18   Ht 5' 7" (1 702 m)   Wt 94 4 kg (208 lb 3 2 oz)   SpO2 97%   BMI 32 61 kg/m²        Physical Exam     Physical Exam   Constitutional: He appears well-developed and well-nourished  No distress  Cardiovascular: Normal rate, regular rhythm and normal heart sounds  No murmur heard  Pulmonary/Chest: Effort normal and breath sounds normal    Abdominal: Soft  Normal appearance and bowel sounds are normal  There is CVA tenderness  There is no rigidity, no rebound and no guarding

## 2019-12-15 NOTE — PATIENT INSTRUCTIONS
1  Take Motrin 600mg  With food every 8 hours as needed for pain   2  Take Flomax  4mg  Daily  3  Increase fluids  4  Proceed to  ER if symptoms worsen

## 2019-12-16 LAB — BACTERIA UR CULT: NORMAL

## 2020-09-26 ENCOUNTER — OFFICE VISIT (OUTPATIENT)
Dept: URGENT CARE | Facility: MEDICAL CENTER | Age: 60
End: 2020-09-26
Payer: COMMERCIAL

## 2020-09-26 VITALS
HEART RATE: 66 BPM | DIASTOLIC BLOOD PRESSURE: 88 MMHG | BODY MASS INDEX: 30.29 KG/M2 | TEMPERATURE: 97.9 F | RESPIRATION RATE: 18 BRPM | OXYGEN SATURATION: 96 % | HEIGHT: 67 IN | WEIGHT: 193 LBS | SYSTOLIC BLOOD PRESSURE: 152 MMHG

## 2020-09-26 DIAGNOSIS — H61.21 IMPACTED CERUMEN OF RIGHT EAR: Primary | ICD-10-CM

## 2020-09-26 DIAGNOSIS — H65.91 FLUID LEVEL BEHIND TYMPANIC MEMBRANE OF RIGHT EAR: ICD-10-CM

## 2020-09-26 PROCEDURE — G0382 LEV 3 HOSP TYPE B ED VISIT: HCPCS | Performed by: PHYSICIAN ASSISTANT

## 2020-09-26 PROCEDURE — 69210 REMOVE IMPACTED EAR WAX UNI: CPT | Performed by: PHYSICIAN ASSISTANT

## 2020-09-26 RX ORDER — PREDNISONE 20 MG/1
20 TABLET ORAL DAILY
Qty: 5 TABLET | Refills: 0 | Status: SHIPPED | OUTPATIENT
Start: 2020-09-26 | End: 2020-10-01

## 2020-09-26 NOTE — PROGRESS NOTES
[facility]        NAME: Dennis Eisenberg is a 64 y o  male  : 1960    MRN: [id number]  DATE: 2020  TIME: 1:52 PM    Assessment and Plan   Impacted cerumen of right ear [H61 21]  1  Impacted cerumen of right ear     2  Fluid level behind tympanic membrane of right ear  predniSONE 20 mg tablet     Patient did have impacted cerumen which was relieved with lavage and instrumentation  This did not relieve his muffled hearing, he does have some fluid behind the eardrum and will treat him with steroids as this is likely the cause of his hearing loss  Patient Instructions   Steroids as directed  Use Debrox daily  Follow up with PCP in 3-5 days  Proceed to  ER if symptoms worsen  Chief Complaint     Chief Complaint   Patient presents with    Ear Fullness     For the past three days  History of Present Illness       Patient is a 82-year-old male who comes in today with bilateral muffled hearing, more so on the right than left  Denies any pain or discharge  No recent swimming  Did try to use Debrox in his ears which did not help much  Review of Systems   Review of Systems   Constitutional: Negative for fever  HENT: Positive for hearing loss  Negative for congestion, ear pain and sore throat  Respiratory: Negative for shortness of breath  Cardiovascular: Negative for chest pain           Current Medications       Current Outpatient Medications:     aspirin 81 MG tablet, Take 1 tablet by mouth daily, Disp: , Rfl:     Multiple Vitamin (DAILY VALUE MULTIVITAMIN) TABS, Take 1 tablet by mouth daily, Disp: , Rfl:     ibuprofen (MOTRIN) 600 mg tablet, Take 1 tablet (600 mg total) by mouth every 8 (eight) hours as needed for mild pain or moderate pain for up to 5 days, Disp: 15 tablet, Rfl: 0    methylPREDNISolone 4 MG tablet therapy pack, Use as directed on package (Patient not taking: Reported on 12/15/2019), Disp: 21 each, Rfl: 0    predniSONE 20 mg tablet, Take 1 tablet (20 mg total) by mouth daily for 5 days, Disp: 5 tablet, Rfl: 0    tamsulosin (FLOMAX) 0 4 mg, Take 1 capsule (0 4 mg total) by mouth daily with dinner for 3 days, Disp: 3 capsule, Rfl: 0    Current Allergies     Allergies as of 09/26/2020    (No Known Allergies)            The following portions of the patient's history were reviewed and updated as appropriate: allergies, current medications, past family history, past medical history, past social history, past surgical history and problem list      Past Medical History:   Diagnosis Date    Benign colon polyp     last assessed [id number]    Complete tear of anterior cruciate ligament of right knee     Gastritis        Past Surgical History:   Procedure Laterality Date    FOOT SURGERY Left        Family History   Problem Relation Age of Onset    Hypertension Father          Medications have been verified  Objective   /88   Pulse 66   Temp 97 9 °F (36 6 °C) (Temporal)   Resp 18   Ht 5' 7" (1 702 m)   Wt 87 5 kg (193 lb)   SpO2 96%   BMI 30 23 kg/m²        Physical Exam     Physical Exam  Constitutional:       General: He is not in acute distress  Appearance: Normal appearance  He is normal weight  HENT:      Right Ear: No swelling  There is impacted cerumen  Left Ear: Tympanic membrane and ear canal normal       Nose: Nose normal       Mouth/Throat:      Mouth: Mucous membranes are moist       Pharynx: Oropharynx is clear  Cardiovascular:      Rate and Rhythm: Normal rate and regular rhythm  Pulmonary:      Effort: Pulmonary effort is normal       Breath sounds: Normal breath sounds  Skin:     General: Skin is warm and dry  Neurological:      Mental Status: He is alert       Ear cerumen removal    Date/Time: 9/26/2020 1:55 PM  Performed by: Bridget Grimaldo PA-C  Authorized by: Bridget Grimaldo PA-C     Patient location:  Clinic  Consent:     Consent obtained:  Verbal    Consent given by:  Patient    Risks discussed:  TM perforation, bleeding and dizziness  Universal protocol:     Patient identity confirmed:  Verbally with patient  Procedure details:     Location:  R ear    Procedure type: irrigation with instrumentation      Instrumentation: curette      Approach:  External    Visualization (free text): Impacted cerumen  Post-procedure details:     Complication:  None    Post-procedure hearing quality: unchanged  Patient tolerance of procedure: Tolerated well, no immediate complications  Comments: There was a significant amount of wax removed from the ear, still no improvement in hearing  There is small effusion noted behind ear  No infection

## 2020-10-12 ENCOUNTER — OFFICE VISIT (OUTPATIENT)
Dept: AUDIOLOGY | Facility: CLINIC | Age: 60
End: 2020-10-12
Payer: COMMERCIAL

## 2020-10-12 ENCOUNTER — OFFICE VISIT (OUTPATIENT)
Dept: OTOLARYNGOLOGY | Facility: CLINIC | Age: 60
End: 2020-10-12
Payer: COMMERCIAL

## 2020-10-12 VITALS — TEMPERATURE: 98.6 F | HEIGHT: 67 IN | WEIGHT: 193 LBS | BODY MASS INDEX: 30.29 KG/M2

## 2020-10-12 DIAGNOSIS — H90.41 SENSORINEURAL HEARING LOSS (SNHL) OF RIGHT EAR WITH UNRESTRICTED HEARING OF LEFT EAR: Primary | ICD-10-CM

## 2020-10-12 DIAGNOSIS — H90.5 SENSORY HEARING LOSS: Primary | ICD-10-CM

## 2020-10-12 PROCEDURE — 92567 TYMPANOMETRY: CPT | Performed by: AUDIOLOGIST

## 2020-10-12 PROCEDURE — 1036F TOBACCO NON-USER: CPT | Performed by: NURSE PRACTITIONER

## 2020-10-12 PROCEDURE — 92557 COMPREHENSIVE HEARING TEST: CPT | Performed by: AUDIOLOGIST

## 2020-10-12 PROCEDURE — 99243 OFF/OP CNSLTJ NEW/EST LOW 30: CPT | Performed by: NURSE PRACTITIONER

## 2020-10-12 RX ORDER — PREDNISONE 20 MG/1
TABLET ORAL
Qty: 42 TABLET | Refills: 0 | Status: SHIPPED | OUTPATIENT
Start: 2020-10-12 | End: 2020-12-10

## 2020-11-24 ENCOUNTER — OFFICE VISIT (OUTPATIENT)
Dept: DERMATOLOGY | Facility: CLINIC | Age: 60
End: 2020-11-24
Payer: COMMERCIAL

## 2020-11-24 VITALS — WEIGHT: 196.6 LBS | TEMPERATURE: 97.4 F | HEIGHT: 67 IN | BODY MASS INDEX: 30.86 KG/M2

## 2020-11-24 DIAGNOSIS — L82.1 SEBORRHEIC KERATOSIS: ICD-10-CM

## 2020-11-24 DIAGNOSIS — D22.9 MULTIPLE MELANOCYTIC NEVI: ICD-10-CM

## 2020-11-24 DIAGNOSIS — D18.01 CHERRY ANGIOMA: ICD-10-CM

## 2020-11-24 DIAGNOSIS — L73.8 SEBACEOUS HYPERPLASIA: Primary | ICD-10-CM

## 2020-11-24 PROCEDURE — 3008F BODY MASS INDEX DOCD: CPT | Performed by: NURSE PRACTITIONER

## 2020-11-24 PROCEDURE — 99204 OFFICE O/P NEW MOD 45 MIN: CPT | Performed by: DERMATOLOGY

## 2020-12-10 ENCOUNTER — OFFICE VISIT (OUTPATIENT)
Dept: FAMILY MEDICINE CLINIC | Facility: CLINIC | Age: 60
End: 2020-12-10
Payer: COMMERCIAL

## 2020-12-10 VITALS
OXYGEN SATURATION: 96 % | HEART RATE: 60 BPM | DIASTOLIC BLOOD PRESSURE: 84 MMHG | SYSTOLIC BLOOD PRESSURE: 130 MMHG | BODY MASS INDEX: 31.08 KG/M2 | TEMPERATURE: 96.1 F | WEIGHT: 198 LBS | HEIGHT: 67 IN

## 2020-12-10 DIAGNOSIS — G56.31 RADIAL NERVE DYSFUNCTION, RIGHT: ICD-10-CM

## 2020-12-10 DIAGNOSIS — Z12.11 COLON CANCER SCREENING: ICD-10-CM

## 2020-12-10 DIAGNOSIS — S64.22XA: Primary | ICD-10-CM

## 2020-12-10 PROCEDURE — 1036F TOBACCO NON-USER: CPT | Performed by: PHYSICIAN ASSISTANT

## 2020-12-10 PROCEDURE — 3008F BODY MASS INDEX DOCD: CPT | Performed by: PHYSICIAN ASSISTANT

## 2020-12-10 PROCEDURE — 3725F SCREEN DEPRESSION PERFORMED: CPT | Performed by: PHYSICIAN ASSISTANT

## 2020-12-10 PROCEDURE — 99214 OFFICE O/P EST MOD 30 MIN: CPT | Performed by: PHYSICIAN ASSISTANT

## 2021-01-18 DIAGNOSIS — Z01.89 LABORATORY PROCEDURE: ICD-10-CM

## 2021-01-18 PROBLEM — R73.03 PREDIABETES: Status: ACTIVE | Noted: 2021-01-18

## 2021-01-18 PROCEDURE — U0003 INFECTIOUS AGENT DETECTION BY NUCLEIC ACID (DNA OR RNA); SEVERE ACUTE RESPIRATORY SYNDROME CORONAVIRUS 2 (SARS-COV-2) (CORONAVIRUS DISEASE [COVID-19]), AMPLIFIED PROBE TECHNIQUE, MAKING USE OF HIGH THROUGHPUT TECHNOLOGIES AS DESCRIBED BY CMS-2020-01-R: HCPCS

## 2021-01-18 PROCEDURE — U0005 INFEC AGEN DETEC AMPLI PROBE: HCPCS

## 2021-01-18 NOTE — PROGRESS NOTES
PRE-OPERATIVE EVALUATION  [facility]    NAME: Ministerio Wheatley  AGE: 64 y o  SEX: male  : 1960     DATE: 2021    Chief Complaint: had concerns including Pre-op Exam      Surgery: detached retina  Anticipated Date of Surgery: 2021  Referring Provider: Dr Lillie Andres at [facility]     HPI  Ministerio Wheatley is a 64 y o  male who presents to the office today for a preoperative consultation at the request of the surgeon for the procedure above  Current anti-platelet/anti-coagulation medications that the patient is prescribed includes: Aspirin  Assessment of Cardiac Risk:  · Denies unstable or severe angina or MI in the last 6 weeks or history of stent placement in the last year   · Denies decompensated heart failure (e g  New onset heart failure, NYHA functional class IV heart failure, or worsening existing heart failure)  · Denies significant arrhythmias such as high grade AV block, symptomatic ventricular arrhythmia, newly recognized ventricular tachycardia, supraventricular tachycardia with resting heart rate >100, or symptomatic bradycardia  · Denies severe heart valve disease including aortic stenosis or symptomatic mitral stenosis     Exercise Capacity:  · Able to walk 4 blocks without symptoms?: Yes  · Able to walk 2 flights without symptoms?: Yes    Prior Anesthesia Reactions: No     Personal history of venous thromboembolic disease? No    History of steroid use for >2 weeks within last year? No      Review of Systems   Constitutional: Negative for chills, fatigue and fever  HENT: Positive for tinnitus  Negative for hearing loss  Eyes: Positive for visual disturbance  Negative for photophobia, pain and redness  Respiratory: Negative for apnea, choking and shortness of breath  Cardiovascular: Negative for chest pain, palpitations and leg swelling  Gastrointestinal: Negative for abdominal pain     Neurological: Negative for dizziness, weakness, light-headedness and headaches         Patient Active Problem List   Diagnosis    Allergic rhinitis    Esophageal reflux    Irritable bowel syndrome    Nodular prostate without lower urinary tract symptoms    Obesity    Right-sided sensorineural hearing loss    Prediabetes       No Known Allergies      Current Outpatient Medications:     aspirin 81 MG tablet, Take 1 tablet by mouth daily, Disp: , Rfl:     Multiple Vitamin (DAILY VALUE MULTIVITAMIN) TABS, Take 1 tablet by mouth daily, Disp: , Rfl:       Past Medical History:   Diagnosis Date    Benign colon polyp     last assessed 06Jun2016    Complete tear of anterior cruciate ligament of right knee     Gastritis         Past Surgical History:   Procedure Laterality Date    FOOT SURGERY Left         Family History   Problem Relation Age of Onset    Hypertension Father         Social History     Socioeconomic History    Marital status: /Civil Union     Spouse name: Not on file    Number of children: Not on file    Years of education: Not on file    Highest education level: Not on file   Occupational History    Not on file   Social Needs    Financial resource strain: Not on file    Food insecurity     Worry: Not on file     Inability: Not on file   [employer] needs     Medical: Not on file     Non-medical: Not on file   Tobacco Use    Smoking status: Never Smoker    Smokeless tobacco: Never Used   Substance and Sexual Activity    Alcohol use: Yes     Comment: socially    Drug use: Never    Sexual activity: Not on file   Lifestyle    Physical activity     Days per week: Not on file     Minutes per session: Not on file    Stress: Not on file   Relationships    Social connections     Talks on phone: Not on file     Gets together: Not on file     Attends Latter-day service: Not on file     Active member of club or organization: Not on file     Attends meetings of clubs or organizations: Not on file     Relationship status: Not on file    Intimate partner violence     Fear of current or ex partner: Not on file     Emotionally abused: Not on file     Physically abused: Not on file     Forced sexual activity: Not on file   Other Topics Concern    Not on file   Social History Narrative    Not on file        /90   Pulse 74   Temp (!) 97 2 °F (36 2 °C)   Resp 14   Ht 5' 7" (1 702 m)   Wt 89 8 kg (198 lb)   BMI 31 01 kg/m²      BP Readings from Last 3 Encounters:   01/19/21 142/90   12/10/20 130/84   09/26/20 152/88     Physical Exam  Constitutional:       General: He is not in acute distress  Appearance: Normal appearance  He is obese  He is not ill-appearing or diaphoretic  HENT:      Head: Normocephalic and atraumatic  Right Ear: External ear normal       Left Ear: External ear normal       Nose: Nose normal    Eyes:      Extraocular Movements: Extraocular movements intact  Conjunctiva/sclera: Conjunctivae normal    Neck:      Musculoskeletal: Normal range of motion and neck supple  No neck rigidity  Vascular: No carotid bruit  Cardiovascular:      Rate and Rhythm: Normal rate and regular rhythm  Pulses: Normal pulses  Heart sounds: Normal heart sounds  No murmur  No friction rub  No gallop  Pulmonary:      Effort: Pulmonary effort is normal  No respiratory distress  Breath sounds: Normal breath sounds  Abdominal:      General: There is no distension  Musculoskeletal: Normal range of motion  Right lower leg: No edema  Left lower leg: No edema  Lymphadenopathy:      Cervical: No cervical adenopathy  Skin:     Findings: No erythema or rash  Neurological:      General: No focal deficit present  Mental Status: He is alert        Gait: Gait normal    Psychiatric:         Mood and Affect: Mood normal          Behavior: Behavior normal           Pre-operative work-up    Laboratory Results: I have personally reviewed the pertinent laboratory results/reports   Last Cr in February 2029 0 87 GFR 96    Lab Results   Component Value Date    HGBA1C 5 7 (H) 02/28/2019     Lab Results   Component Value Date    CHOLESTEROL 182 09/27/2018    CHOLESTEROL 216 (H) 06/08/2016     Lab Results   Component Value Date    HDL 64 (H) 09/27/2018    HDL 70 (H) 06/08/2016     Lab Results   Component Value Date    TRIG 81 09/27/2018    TRIG 90 06/08/2016     Lab Results   Component Value Date    NONHDLC 118 09/27/2018     Lab Results   Component Value Date    LDLCALC 102 (H) 09/27/2018       EKG: sinus bradycardia, normal intervals, no ST segment elevations or depressions          Pre-Op Evaluation Assessment  Cardiac Risk Estimation: per the Revised Cardiac Risk Index (Circ  100:1043, 1999), the patient's risk factors for cardiac complications include none, putting him in: RCI RISK CLASS I (0 risk factors, risk of major cardiac compl  appr  0 5%)  Anthony Wing is undergoing an elective Minimal risk surgery  They are at [address] for major adverse cardiac event (MACE)  They may proceed with surgery as planned without further workup  Pre-Op Evaluation Plan  1  Further preoperative workup as follows:   - None; no further preoperative work-up is required    2  Medication Management/Recommendations:   - Patient has been instructed to avoid herbs or non-directed vitamins the week prior to surgery to ensure no drug interactions with perioperative surgical and anesthetic medications  - Regarding anti-platelet agents: hold aspirin  3  Patient requires further consultation with: None    4   Assessment of Chronic Conditions:  Problem List Items Addressed This Visit        Other    Obesity    Prediabetes      Other Visit Diagnoses     Pre-operative clearance    -  Primary    Relevant Orders    POCT ECG (Completed)    Dyslipidemia        Elevated blood-pressure reading, without diagnosis of hypertension            No further workup needed at this time for low risk procedure on a patient without significant cardiovascular risk factors  Normal EKG today  Blood pressure mildly elevated however patient asymptomatic  He is due for physical for follow-up of elevated blood pressure, replete labs to evaluate for prediabetes and hyperlipidemia  Pre-op form completed and faxed to referring physician noted above      Omari Peck MD  [facility]  [address] [place] [id number]  Phone#  [phone]  Fax#  [phone]

## 2021-01-19 ENCOUNTER — OFFICE VISIT (OUTPATIENT)
Dept: FAMILY MEDICINE CLINIC | Facility: CLINIC | Age: 61
End: 2021-01-19
Payer: COMMERCIAL

## 2021-01-19 ENCOUNTER — APPOINTMENT (OUTPATIENT)
Dept: LAB | Age: 61
End: 2021-01-19
Payer: COMMERCIAL

## 2021-01-19 VITALS
SYSTOLIC BLOOD PRESSURE: 142 MMHG | HEIGHT: 67 IN | RESPIRATION RATE: 14 BRPM | DIASTOLIC BLOOD PRESSURE: 90 MMHG | HEART RATE: 74 BPM | BODY MASS INDEX: 31.08 KG/M2 | TEMPERATURE: 97.2 F | WEIGHT: 198 LBS

## 2021-01-19 DIAGNOSIS — R03.0 ELEVATED BLOOD-PRESSURE READING, WITHOUT DIAGNOSIS OF HYPERTENSION: ICD-10-CM

## 2021-01-19 DIAGNOSIS — R73.03 PREDIABETES: ICD-10-CM

## 2021-01-19 DIAGNOSIS — Z01.818 PRE-OPERATIVE CLEARANCE: Primary | ICD-10-CM

## 2021-01-19 DIAGNOSIS — H90.3 ASYMMETRIC SNHL (SENSORINEURAL HEARING LOSS): ICD-10-CM

## 2021-01-19 DIAGNOSIS — E66.09 CLASS 1 OBESITY DUE TO EXCESS CALORIES WITHOUT SERIOUS COMORBIDITY WITH BODY MASS INDEX (BMI) OF 31.0 TO 31.9 IN ADULT: ICD-10-CM

## 2021-01-19 DIAGNOSIS — E78.5 DYSLIPIDEMIA: ICD-10-CM

## 2021-01-19 LAB — SARS-COV-2 RNA RESP QL NAA+PROBE: NEGATIVE

## 2021-01-19 PROCEDURE — 99214 OFFICE O/P EST MOD 30 MIN: CPT | Performed by: FAMILY MEDICINE

## 2021-01-19 PROCEDURE — 36415 COLL VENOUS BLD VENIPUNCTURE: CPT

## 2021-01-19 PROCEDURE — 86618 LYME DISEASE ANTIBODY: CPT

## 2021-01-19 PROCEDURE — 93000 ELECTROCARDIOGRAM COMPLETE: CPT | Performed by: FAMILY MEDICINE

## 2021-01-19 PROCEDURE — 1036F TOBACCO NON-USER: CPT | Performed by: FAMILY MEDICINE

## 2021-01-19 PROCEDURE — 3008F BODY MASS INDEX DOCD: CPT | Performed by: FAMILY MEDICINE

## 2021-01-20 LAB — B BURGDOR IGG+IGM SER-ACNC: 40

## 2021-05-06 ENCOUNTER — IMMUNIZATIONS (OUTPATIENT)
Dept: FAMILY MEDICINE CLINIC | Facility: HOSPITAL | Age: 61
End: 2021-05-06

## 2021-05-06 DIAGNOSIS — Z23 ENCOUNTER FOR IMMUNIZATION: Primary | ICD-10-CM

## 2021-05-06 PROCEDURE — 0001A SARS-COV-2 / COVID-19 MRNA VACCINE (PFIZER-BIONTECH) 30 MCG: CPT

## 2021-05-06 PROCEDURE — 91300 SARS-COV-2 / COVID-19 MRNA VACCINE (PFIZER-BIONTECH) 30 MCG: CPT

## 2021-05-12 ENCOUNTER — OFFICE VISIT (OUTPATIENT)
Dept: FAMILY MEDICINE CLINIC | Facility: CLINIC | Age: 61
End: 2021-05-12
Payer: COMMERCIAL

## 2021-05-12 ENCOUNTER — TELEPHONE (OUTPATIENT)
Dept: ADMINISTRATIVE | Facility: OTHER | Age: 61
End: 2021-05-12

## 2021-05-12 VITALS
HEART RATE: 64 BPM | TEMPERATURE: 97.5 F | WEIGHT: 187 LBS | RESPIRATION RATE: 16 BRPM | BODY MASS INDEX: 28.34 KG/M2 | SYSTOLIC BLOOD PRESSURE: 132 MMHG | DIASTOLIC BLOOD PRESSURE: 76 MMHG | HEIGHT: 68 IN

## 2021-05-12 DIAGNOSIS — Z00.00 WELL ADULT EXAM: Primary | ICD-10-CM

## 2021-05-12 PROCEDURE — 3008F BODY MASS INDEX DOCD: CPT | Performed by: FAMILY MEDICINE

## 2021-05-12 PROCEDURE — 1036F TOBACCO NON-USER: CPT | Performed by: FAMILY MEDICINE

## 2021-05-12 PROCEDURE — 99396 PREV VISIT EST AGE 40-64: CPT | Performed by: FAMILY MEDICINE

## 2021-05-12 NOTE — TELEPHONE ENCOUNTER
Upon review of the In Basket request we were able to note that no further action is required  The patient chart is up to date  Any additional questions or concerns should be emailed to the Practice Liaisons via [email or web address] email, please do not reply via In Basket      Thank you  Cinthia Gonzalez

## 2021-05-12 NOTE — PROGRESS NOTES
Assessment/Plan:     Diagnoses and all orders for this visit:    Well adult exam        Patient had labs done recently through his employer- obtain copy for chart  He has received his 1st COVID-19 vaccine  BMI Counseling: Body mass index is 28 43 kg/m²  The BMI is above normal  Nutrition recommendations include reducing portion sizes, decreasing overall calorie intake, consuming healthier snacks, moderation in carbohydrate intake, reducing intake of saturated fat and trans fat and reducing intake of cholesterol  Exercise recommendations include exercising 3-5 times per week  Patient ID: Nena Ortega is a 64 y o  male  64year old here for wellness exam   Medications none  OTCs ASA 81 mg daily, MVI and Metamucil  Hospitalizations/surgeries  Left foot surgery  Surgery for detached retina OD  Family history hypertension father  Hyperlipidemia with high HDL Lipid profile 02/2019  cholesterol 196  Triglycerides 91  HDL 60    A1c 5 7  06/2016 TSH 3 210  Physically active  Officiates lacrosse    2 children  The following portions of the patient's history were reviewed and updated as appropriate: allergies, current medications, past family history, past medical history, past social history, past surgical history and problem list     Review of Systems   Constitutional: Positive for unexpected weight change (11 lb weight loss from 01/2021 with diet and exercise)  Negative for appetite change, chills and fever  HENT: Positive for hearing loss  Negative for congestion, ear pain, rhinorrhea, sore throat and trouble swallowing  12/2020 ENT evaluation for low frequency sensorineural hearing loss right greater than left  MRI old brain showed no structural abnormalities, acoustic neuroma  Lyme titer negative  Eyes: Positive for visual disturbance  01/2021 Surgery for detached retina right eye    He is scheduled for cataract surgery right eye this month   Respiratory: Negative for cough, shortness of breath and wheezing  Cardiovascular: Negative for chest pain, palpitations and leg swelling  Gastrointestinal: Negative for abdominal pain, blood in stool, constipation, diarrhea, nausea and vomiting         12/2020 Cologuard negative  09/2018  hepatitis C antibody negative   Endocrine: Negative for polydipsia and polyuria  Genitourinary: Negative for difficulty urinating  Lab Results       Component                Value               Date                       PSA                      1 6                 09/27/2018                 PSA                      1 5                 06/08/2016                 PSA                      1 2                 03/27/2014                 Musculoskeletal: Negative for arthralgias and myalgias  Skin: Negative for rash  11/2020 Dermatology evaluation    Allergic/Immunologic: Negative for environmental allergies  Neurological: Negative for dizziness and headaches  Hematological: Negative for adenopathy  Does not bruise/bleed easily  Psychiatric/Behavioral: Negative for dysphoric mood and sleep disturbance  Objective:    /76   Pulse 64   Temp 97 5 °F (36 4 °C)   Resp 16   Ht 5' 8" (1 727 m)   Wt 84 8 kg (187 lb)   BMI 28 43 kg/m²     BP Readings from Last 3 Encounters:   05/12/21 132/76   01/19/21 142/90   12/10/20 130/84     Wt Readings from Last 3 Encounters:   05/12/21 84 8 kg (187 lb)   01/19/21 89 8 kg (198 lb)   01/19/21 89 8 kg (198 lb)          Physical Exam  Vitals signs and nursing note reviewed  Constitutional:       General: He is not in acute distress  Appearance: He is well-developed  HENT:      Right Ear: Tympanic membrane and ear canal normal       Left Ear: Tympanic membrane and ear canal normal    Eyes:      General: No scleral icterus  Extraocular Movements: Extraocular movements intact        Conjunctiva/sclera: Conjunctivae normal       Pupils: Pupils are equal, round, and reactive to light  Comments: Cataract OD   Neck:      Thyroid: No thyroid mass or thyromegaly  Vascular: No carotid bruit or JVD  Trachea: No tracheal deviation  Cardiovascular:      Rate and Rhythm: Normal rate and regular rhythm  Pulses:           Carotid pulses are 2+ on the right side and 2+ on the left side  Heart sounds: Normal heart sounds  No murmur  No gallop  Pulmonary:      Effort: Pulmonary effort is normal  No respiratory distress  Breath sounds: Normal breath sounds  No wheezing or rales  Abdominal:      General: Bowel sounds are normal  There is no distension or abdominal bruit  Palpations: Abdomen is soft  There is no hepatomegaly, splenomegaly or mass  Tenderness: There is no abdominal tenderness  There is no guarding or rebound  Musculoskeletal:      Right lower leg: No edema  Left lower leg: No edema  Lymphadenopathy:      Cervical: No cervical adenopathy  Upper Body:      Right upper body: No supraclavicular adenopathy  Left upper body: No supraclavicular adenopathy  Skin:     Findings: No rash  Nails: There is no clubbing  Neurological:      General: No focal deficit present  Mental Status: He is alert and oriented to person, place, and time     Psychiatric:         Mood and Affect: Mood normal          Behavior: Behavior normal

## 2021-05-12 NOTE — TELEPHONE ENCOUNTER
----- Message from Phil Pulido sent at 5/11/2021 12:19 PM EDT -----  Regarding: Tanya  05/11/21 12:20 PM    Hello, our patient Farrah Solis has had Cologuard completed/performed   Please assist in updating the patient chart by  reviewing results in [facility] date of service is 12/31/2020    Thank you,  Phil SANDERSON

## 2021-05-29 ENCOUNTER — IMMUNIZATIONS (OUTPATIENT)
Dept: FAMILY MEDICINE CLINIC | Facility: HOSPITAL | Age: 61
End: 2021-05-29

## 2021-05-29 DIAGNOSIS — Z23 ENCOUNTER FOR IMMUNIZATION: Primary | ICD-10-CM

## 2021-05-29 PROCEDURE — 0002A SARS-COV-2 / COVID-19 MRNA VACCINE (PFIZER-BIONTECH) 30 MCG: CPT

## 2021-05-29 PROCEDURE — 91300 SARS-COV-2 / COVID-19 MRNA VACCINE (PFIZER-BIONTECH) 30 MCG: CPT

## 2021-12-22 ENCOUNTER — CLINICAL SUPPORT (OUTPATIENT)
Dept: FAMILY MEDICINE CLINIC | Facility: CLINIC | Age: 61
End: 2021-12-22
Payer: COMMERCIAL

## 2021-12-22 ENCOUNTER — TELEPHONE (OUTPATIENT)
Dept: FAMILY MEDICINE CLINIC | Facility: CLINIC | Age: 61
End: 2021-12-22

## 2021-12-22 DIAGNOSIS — Z23 ENCOUNTER FOR IMMUNIZATION: Primary | ICD-10-CM

## 2021-12-22 PROCEDURE — 90750 HZV VACC RECOMBINANT IM: CPT

## 2021-12-22 PROCEDURE — 90471 IMMUNIZATION ADMIN: CPT

## 2022-01-18 ENCOUNTER — APPOINTMENT (OUTPATIENT)
Dept: LAB | Facility: MEDICAL CENTER | Age: 62
End: 2022-01-18
Payer: COMMERCIAL

## 2022-01-18 DIAGNOSIS — Z12.5 SCREENING FOR PROSTATE CANCER: ICD-10-CM

## 2022-01-18 PROCEDURE — 84153 ASSAY OF PSA TOTAL: CPT

## 2022-01-19 LAB — PSA SERPL-MCNC: 1.3 NG/ML (ref 0–4)

## 2022-02-24 ENCOUNTER — CLINICAL SUPPORT (OUTPATIENT)
Dept: FAMILY MEDICINE CLINIC | Facility: CLINIC | Age: 62
End: 2022-02-24
Payer: COMMERCIAL

## 2022-02-24 DIAGNOSIS — Z23 ENCOUNTER FOR IMMUNIZATION: Primary | ICD-10-CM

## 2022-02-24 PROCEDURE — 90750 HZV VACC RECOMBINANT IM: CPT

## 2022-02-24 PROCEDURE — 90471 IMMUNIZATION ADMIN: CPT

## 2022-05-31 ENCOUNTER — OFFICE VISIT (OUTPATIENT)
Dept: FAMILY MEDICINE CLINIC | Facility: CLINIC | Age: 62
End: 2022-05-31
Payer: COMMERCIAL

## 2022-05-31 VITALS
BODY MASS INDEX: 29.55 KG/M2 | WEIGHT: 195 LBS | DIASTOLIC BLOOD PRESSURE: 78 MMHG | SYSTOLIC BLOOD PRESSURE: 118 MMHG | HEART RATE: 76 BPM | RESPIRATION RATE: 16 BRPM | HEIGHT: 68 IN | TEMPERATURE: 98.2 F

## 2022-05-31 DIAGNOSIS — Z00.00 WELL ADULT EXAM: Primary | ICD-10-CM

## 2022-05-31 PROBLEM — R73.03 PREDIABETES: Status: RESOLVED | Noted: 2021-01-18 | Resolved: 2022-05-31

## 2022-05-31 PROCEDURE — 1036F TOBACCO NON-USER: CPT | Performed by: FAMILY MEDICINE

## 2022-05-31 PROCEDURE — 3008F BODY MASS INDEX DOCD: CPT | Performed by: FAMILY MEDICINE

## 2022-05-31 PROCEDURE — 3725F SCREEN DEPRESSION PERFORMED: CPT | Performed by: FAMILY MEDICINE

## 2022-05-31 PROCEDURE — 99396 PREV VISIT EST AGE 40-64: CPT | Performed by: FAMILY MEDICINE

## 2022-05-31 NOTE — PROGRESS NOTES
Assessment/Plan:         Diagnoses and all orders for this visit:    Well adult exam          OV 1 year     BMI Counseling: Body mass index is 29 65 kg/m²  The BMI is above normal  Nutrition recommendations include reducing portion sizes, decreasing overall calorie intake, consuming healthier snacks, moderation in carbohydrate intake, reducing intake of saturated fat and trans fat and reducing intake of cholesterol  Exercise recommendations include exercising 3-5 times per week  Patient ID: Rocklin Cushing is a 63 y o  male  63year old here for Wellness exam   Medications reviewed  Hospitalizations/surgeries  Hospitalizations/surgeries/SH/FH reviewed see note  Hyperlipidemia with high HDL Lipid profile 04/2022 cholesterol 191  Triglycerides 77  HDL 85  LDL 89  A1c 5 4 decreased from 5 7  06/2016 TSH 3 210  Physically active  Officiates lacrosse/basketball/soccer  No Known Allergies       Current Outpatient Medications:     aspirin 81 MG tablet, Take 1 tablet by mouth daily, Disp: , Rfl:     Multiple Vitamin (DAILY VALUE MULTIVITAMIN) TABS, Take 1 tablet by mouth daily, Disp: , Rfl:      Social History     Tobacco Use    Smoking status: Never Smoker    Smokeless tobacco: Never Used   Vaping Use    Vaping Use: Never used   Substance Use Topics    Alcohol use: Yes     Comment: socially    Drug use: Never     Family History   Problem Relation Age of Onset    Hypertension Father      Past Surgical History:   Procedure Laterality Date    EYE SURGERY Right     detached retina OD, cataract surgery OD    FOOT SURGERY Left          The following portions of the patient's history were reviewed and updated as appropriate: allergies, current medications, past family history, past medical history, past social history, past surgical history and problem list     Review of Systems   Constitutional: Negative for appetite change, chills, fever and unexpected weight change  HENT: Positive for hearing loss  Negative for congestion, ear pain, rhinorrhea, sore throat and trouble swallowing  12/2020 ENT evaluation for low frequency sensorineural hearing loss right greater than left  MRI old brain showed no structural abnormalities, acoustic neuroma  Lyme titer negative  Eyes: Negative for visual disturbance  01/2021 Surgery for detached retina right eye  Cataract surgery right eye 05/2021   Respiratory: Negative for cough, shortness of breath and wheezing  Cardiovascular: Negative for chest pain, palpitations and leg swelling  Gastrointestinal: Negative for abdominal pain, blood in stool, constipation, diarrhea, nausea and vomiting         12/2020 Cologuard negative  09/2018  hepatitis C antibody negative   Endocrine: Negative for polydipsia and polyuria  Genitourinary: Negative for difficulty urinating  Lab Results       Component                Value               Date                       PSA                      1 3                 01/18/2022                 PSA                      1 6                 09/27/2018                 PSA                      1 5                 06/08/2016                            Musculoskeletal: Negative for arthralgias and myalgias  Skin: Negative for rash  11/2020 Dermatology evaluation    Allergic/Immunologic: Negative for environmental allergies  Neurological: Negative for dizziness and headaches  Hematological: Negative for adenopathy  Does not bruise/bleed easily  Psychiatric/Behavioral: Negative for dysphoric mood and sleep disturbance           Objective:      /78   Pulse 76   Temp 98 2 °F (36 8 °C)   Resp 16   Ht 5' 8" (1 727 m)   Wt 88 5 kg (195 lb)   BMI 29 65 kg/m²       BP Readings from Last 3 Encounters:   05/31/22 118/78   05/12/21 132/76   01/19/21 142/90     Wt Readings from Last 3 Encounters:   05/31/22 88 5 kg (195 lb)   05/12/21 84 8 kg (187 lb)   01/19/21 89 8 kg (198 lb)          Physical Exam  Vitals and nursing note reviewed  Constitutional:       General: He is not in acute distress  Appearance: He is well-developed  HENT:      Right Ear: Tympanic membrane normal       Left Ear: Tympanic membrane normal    Eyes:      General: No scleral icterus  Extraocular Movements: Extraocular movements intact  Conjunctiva/sclera: Conjunctivae normal       Pupils: Pupils are equal, round, and reactive to light  Neck:      Thyroid: No thyroid mass or thyromegaly  Vascular: No carotid bruit or JVD  Trachea: No tracheal deviation  Cardiovascular:      Rate and Rhythm: Normal rate and regular rhythm  Pulses:           Carotid pulses are 2+ on the right side and 2+ on the left side  Heart sounds: Normal heart sounds  No murmur heard  No gallop  Pulmonary:      Effort: Pulmonary effort is normal  No respiratory distress  Breath sounds: Normal breath sounds  No wheezing or rales  Chest:   Breasts:      Right: No supraclavicular adenopathy  Left: No supraclavicular adenopathy  Abdominal:      General: Bowel sounds are normal  There is no distension or abdominal bruit  Palpations: Abdomen is soft  There is no hepatomegaly, splenomegaly or mass  Tenderness: There is no abdominal tenderness  There is no guarding or rebound  Musculoskeletal:      Right lower leg: No edema  Left lower leg: No edema  Lymphadenopathy:      Cervical: No cervical adenopathy  Upper Body:      Right upper body: No supraclavicular adenopathy  Left upper body: No supraclavicular adenopathy  Skin:     Findings: No rash  Nails: There is no clubbing  Neurological:      General: No focal deficit present  Mental Status: He is alert and oriented to person, place, and time     Psychiatric:         Mood and Affect: Mood normal

## 2022-06-01 ENCOUNTER — TELEPHONE (OUTPATIENT)
Dept: ADMINISTRATIVE | Facility: OTHER | Age: 62
End: 2022-06-01

## 2022-06-01 NOTE — TELEPHONE ENCOUNTER
----- Message from Nima Bonilla sent at 5/31/2022  3:22 PM EDT -----  Regarding: Grant Dill  05/31/22 3:22 PM    Hello, our patient Cocke Obernburg has had a Colguard completed/performed   Please assist in updating the patient chart by reviewing results in lab portion of EPIC The date of service is 12/22/2020    Thank you,  Nima SANDERSON

## 2022-06-03 NOTE — TELEPHONE ENCOUNTER
Upon review of the In Basket request we 12-22-20 cologuard in [facility] will change to 3 years  Any additional questions or concerns should be emailed to the Practice Liaisons via [email or web address] email, please do not reply via In Basket      Thank you  Diana De Los Santos MA

## 2023-06-08 ENCOUNTER — OFFICE VISIT (OUTPATIENT)
Dept: FAMILY MEDICINE CLINIC | Facility: CLINIC | Age: 63
End: 2023-06-08
Payer: COMMERCIAL

## 2023-06-08 VITALS
OXYGEN SATURATION: 98 % | HEIGHT: 68 IN | SYSTOLIC BLOOD PRESSURE: 120 MMHG | WEIGHT: 198 LBS | BODY MASS INDEX: 30.01 KG/M2 | HEART RATE: 58 BPM | TEMPERATURE: 97.7 F | DIASTOLIC BLOOD PRESSURE: 86 MMHG

## 2023-06-08 DIAGNOSIS — Z00.00 WELL ADULT EXAM: Primary | ICD-10-CM

## 2023-06-08 DIAGNOSIS — M54.12 CERVICAL RADICULOPATHY: ICD-10-CM

## 2023-06-08 DIAGNOSIS — Z12.5 SCREENING FOR PROSTATE CANCER: ICD-10-CM

## 2023-06-08 DIAGNOSIS — Z12.11 SCREENING FOR COLON CANCER: ICD-10-CM

## 2023-06-08 PROCEDURE — 99396 PREV VISIT EST AGE 40-64: CPT | Performed by: FAMILY MEDICINE

## 2023-06-08 RX ORDER — TRIAMCINOLONE ACETONIDE 1 MG/G
CREAM TOPICAL
COMMUNITY
Start: 2023-03-21

## 2023-06-08 NOTE — PROGRESS NOTES
Name: Alyce Rosas      : 1960      MRN: [id number]  Encounter Provider: Luca Aburto MD  Encounter Date: 2023   Encounter department: [address] [place] [address]     1  Well adult exam    2  Cervical radiculopathy  -     XR spine cervical complete 4 or 5 vw non injury; Future; Expected date: 2023    3  Screening for prostate cancer  -     PSA, Total Screen; Future    4  Screening for colon cancer  -     Cologuard    Labs PSA  Cervical spine x-rays  Consider trial of physical therapy  BMI Counseling: Body mass index is 30 11 kg/m²  The BMI is above normal  Nutrition recommendations include consuming healthier snacks, moderation in carbohydrate intake, reducing intake of saturated fat and trans fat and reducing intake of cholesterol  Exercise recommendations include exercising 3-5 times per week  Subjective     63year old here for Wellness exam   Medications reviewed  Hospitalizations/surgeries  Hospitalizations/surgeries/SH/FH reviewed see note  Hyperlipidemia with high HDL Lipid profile 2023 cholesterol 192  Triglycerides 73  HDL 72    A1c 5 4  2016 TSH 3 210  Physically active  Officiates lacrosse/basketball/soccer  Review of Systems   Constitutional: Negative for appetite change, chills, fever and unexpected weight change  HENT: Positive for hearing loss  Negative for congestion, ear pain, rhinorrhea, sore throat and trouble swallowing  2020 ENT evaluation for low frequency sensorineural hearing loss right greater than left  MRI old brain showed no structural abnormalities, acoustic neuroma  Lyme titer negative  Eyes: Negative for visual disturbance  2021 Surgery for detached retina right eye  Cataract surgery right eye 2021   Respiratory: Negative for cough, shortness of breath and wheezing  Cardiovascular: Negative for chest pain, palpitations and leg swelling     Gastrointestinal: Negative for abdominal pain, blood in stool, constipation, diarrhea, nausea and vomiting         12/2020 Cologuard negative  09/2018  hepatitis C antibody negative   Endocrine: Negative for polydipsia and polyuria  Genitourinary: Negative for difficulty urinating  Lab Results       Component                Value               Date                       PSA                      1 3                 01/18/2022                 PSA                      1 6                 09/27/2018                 PSA                      1 5                 06/08/2016                                    Musculoskeletal: Positive for neck pain  Negative for arthralgias and myalgias  Intermittent left-sided neck pain/ left trapezius pain with intermittent numbness and tingling in left arm no left arm weakness or hand weakness  No bowel or bladder changes  Patient had several sessions with a chiropractor with no improvement  Skin: Negative for rash  Allergic/Immunologic: Negative for environmental allergies  Neurological: Positive for numbness  Negative for dizziness, weakness and headaches  Hematological: Negative for adenopathy  Does not bruise/bleed easily  Psychiatric/Behavioral: Negative for dysphoric mood and sleep disturbance         Past Medical History:   Diagnosis Date   • Benign colon polyp     last assessed 06Jun2016   • Complete tear of anterior cruciate ligament of right knee    • Gastritis      Past Surgical History:   Procedure Laterality Date   • EYE SURGERY Right     detached retina OD, cataract surgery OD   • FOOT SURGERY Left      Family History   Problem Relation Age of Onset   • Hypertension Father      Social History     Socioeconomic History   • Marital status: /Civil Union     Spouse name: None   • Number of children: None   • Years of education: None   • Highest education level: None   Occupational History   • None   Tobacco Use   • Smoking status: Never     Passive exposure: Past   • Smokeless tobacco: "Former     Types: Chew     Quit date: 1/1/2018   Vaping Use   • Vaping Use: Never used   Substance and Sexual Activity   • Alcohol use: Not Currently     Alcohol/week: 4 0 standard drinks of alcohol     Types: 4 Cans of beer per week     Comment: socially   • Drug use: Never   • Sexual activity: Not Currently     Partners: Female     Birth control/protection: Condom Male   Other Topics Concern   • None   Social History Narrative   • None     Social Determinants of Health     Financial Resource Strain: Not on file   Food Insecurity: Not on file   Transportation Needs: Not on file   Physical Activity: Not on file   Stress: Not on file   Social Connections: Not on file   Intimate Partner Violence: Not on file   Housing Stability: Not on file     Current Outpatient Medications on File Prior to Visit   Medication Sig   • aspirin 81 MG tablet Take 1 tablet by mouth daily   • Multiple Vitamin (DAILY VALUE MULTIVITAMIN) TABS Take 1 tablet by mouth daily   • triamcinolone (KENALOG) 0 1 % cream APPLY TO AFFECTED AREA(S) UP TO TWICE DAILY FOR 2 WEEKS ON, 1 WEEK OFF AS NEEDED FOR FLARE UPS     No Known Allergies  Immunization History   Administered Date(s) Administered   • COVID-19 PFIZER VACCINE 0 3 ML IM 05/06/2021, 05/29/2021   • Tdap 07/17/2016   • Zoster Vaccine Recombinant 12/22/2021, 02/24/2022       Objective     /86   Pulse 58   Temp 97 7 °F (36 5 °C)   Ht 5' 8\" (1 727 m)   Wt 89 8 kg (198 lb)   SpO2 98%   BMI 30 11 kg/m²     BP Readings from Last 3 Encounters:   06/08/23 120/86   05/31/22 118/78   05/12/21 132/76     Wt Readings from Last 3 Encounters:   06/08/23 89 8 kg (198 lb)   05/31/22 88 5 kg (195 lb)   05/12/21 84 8 kg (187 lb)         Physical Exam  Vitals and nursing note reviewed  Constitutional:       General: He is not in acute distress  Appearance: He is well-developed     HENT:      Right Ear: Tympanic membrane and ear canal normal       Left Ear: Tympanic membrane and ear canal normal  " Mouth/Throat:      Mouth: No oral lesions  Pharynx: Oropharynx is clear  Eyes:      General: No scleral icterus  Extraocular Movements: Extraocular movements intact  Conjunctiva/sclera: Conjunctivae normal       Pupils: Pupils are equal, round, and reactive to light  Neck:      Thyroid: No thyroid mass or thyromegaly  Vascular: No carotid bruit or JVD  Trachea: No tracheal deviation  Cardiovascular:      Rate and Rhythm: Normal rate and regular rhythm  Pulses:           Carotid pulses are 2+ on the right side and 2+ on the left side  Heart sounds: Normal heart sounds  No murmur heard  No gallop  Pulmonary:      Effort: Pulmonary effort is normal  No respiratory distress  Breath sounds: Normal breath sounds  No wheezing or rales  Abdominal:      General: Bowel sounds are normal  There is no distension or abdominal bruit  Palpations: Abdomen is soft  There is no hepatomegaly, splenomegaly or mass  Tenderness: There is no abdominal tenderness  There is no guarding or rebound  Musculoskeletal:      Cervical back: Tenderness (mild tenderness L trapezius ) present  No bony tenderness  Pain with movement (L lateral rotation ) present  Normal range of motion  Right lower leg: No edema  Left lower leg: No edema  Lymphadenopathy:      Cervical: No cervical adenopathy  Upper Body:      Right upper body: No supraclavicular adenopathy  Left upper body: No supraclavicular adenopathy  Skin:     Findings: No rash  Nails: There is no clubbing  Neurological:      General: No focal deficit present  Mental Status: He is alert and oriented to person, place, and time  Sensory: Sensation is intact  Motor: No weakness        Deep Tendon Reflexes: Reflexes normal       Comments: Strength in upper extremities normal   Negative Spurling test   Psychiatric:         Mood and Affect: Mood normal          Behavior: Behavior normal  Pedro Snell MD

## 2023-06-09 ENCOUNTER — APPOINTMENT (OUTPATIENT)
Dept: RADIOLOGY | Facility: MEDICAL CENTER | Age: 63
End: 2023-06-09
Payer: COMMERCIAL

## 2023-06-09 DIAGNOSIS — M54.12 CERVICAL RADICULOPATHY: ICD-10-CM

## 2023-06-09 PROCEDURE — 72050 X-RAY EXAM NECK SPINE 4/5VWS: CPT

## 2023-09-06 ENCOUNTER — APPOINTMENT (OUTPATIENT)
Dept: LAB | Facility: MEDICAL CENTER | Age: 63
End: 2023-09-06
Payer: COMMERCIAL

## 2023-09-06 DIAGNOSIS — Z12.5 SCREENING FOR PROSTATE CANCER: ICD-10-CM

## 2023-09-06 LAB — PSA SERPL-MCNC: 2.12 NG/ML (ref 0–4)

## 2023-09-06 PROCEDURE — 36415 COLL VENOUS BLD VENIPUNCTURE: CPT

## 2023-09-06 PROCEDURE — G0103 PSA SCREENING: HCPCS

## 2024-01-11 LAB — COLOGUARD RESULT REPORTABLE: NEGATIVE

## 2024-06-25 ENCOUNTER — OFFICE VISIT (OUTPATIENT)
Dept: FAMILY MEDICINE CLINIC | Facility: CLINIC | Age: 64
End: 2024-06-25
Payer: COMMERCIAL

## 2024-06-25 VITALS
BODY MASS INDEX: 31.39 KG/M2 | WEIGHT: 200 LBS | OXYGEN SATURATION: 96 % | HEART RATE: 66 BPM | HEIGHT: 67 IN | RESPIRATION RATE: 18 BRPM | SYSTOLIC BLOOD PRESSURE: 130 MMHG | DIASTOLIC BLOOD PRESSURE: 76 MMHG | TEMPERATURE: 97.8 F

## 2024-06-25 DIAGNOSIS — Z12.5 SCREENING FOR PROSTATE CANCER: ICD-10-CM

## 2024-06-25 DIAGNOSIS — Z00.00 WELL ADULT EXAM: Primary | ICD-10-CM

## 2024-06-25 DIAGNOSIS — M20.21 HALLUX RIGIDUS OF RIGHT FOOT: ICD-10-CM

## 2024-06-25 PROCEDURE — 99396 PREV VISIT EST AGE 40-64: CPT | Performed by: FAMILY MEDICINE

## 2024-06-25 NOTE — PROGRESS NOTES
Ambulatory Visit  Name: David Hernandez      : 1960      MRN: [id number]  Encounter Provider: Victor Manuel Javier MD  Encounter Date: 2024   Encounter department: [facility]    Assessment & Plan   1. Well adult exam  2. Hallux rigidus of right foot  3. Screening for prostate cancer  -     PSA, Total Screen; Future    Labs pending from [facility].    Repeat PSA 2024.    Follow-up with Podiatry regarding right great toe    Continue with topical steroid cream    OV 1 year     BMI Counseling: Body mass index is 31.32 kg/m². The BMI is above normal. Nutrition recommendations include consuming healthier snacks, moderation in carbohydrate intake, reducing intake of saturated fat and trans fat, and reducing intake of cholesterol. Exercise recommendations include exercising 3-5 times per week.          History of Present Illness     64 year old here for Wellness exam.  Medications reviewed. Hospitalizations/surgeries. Hospitalizations/surgeries/SH/FH reviewed see note. Physically active. Officiates lacrosse/basketball/soccer    Hyperlipidemia with high HDL Lipid profile 2023 cholesterol 192. Triglycerides 73.  HDL 72.  . A1c 5.4. 2016 TSH 3.210. Physically active.       Review of Systems   Constitutional:  Negative for appetite change, chills, fever and unexpected weight change.   HENT:  Positive for hearing loss. Negative for congestion, ear pain, rhinorrhea, sore throat and trouble swallowing.         2020 ENT evaluation for low frequency sensorineural hearing loss right greater than left.  MRI old brain showed no structural abnormalities, acoustic neuroma. Lyme titer negative.    Eyes:  Negative for visual disturbance.        2021 Surgery for detached retina right eye.  Cataract surgery right eye 2021   Respiratory:  Negative for cough, shortness of breath and wheezing.    Cardiovascular:  Negative for chest pain, palpitations and leg swelling.   Gastrointestinal:  Negative for  abdominal pain, blood in stool, constipation, diarrhea, nausea and vomiting.        01/2024  Cologuard negative. 09/2018  hepatitis C antibody negative   Endocrine: Negative for polydipsia and polyuria.   Genitourinary:  Negative for difficulty urinating.        Lab Results       Component                Value               Date                       PSA                      2.12                09/06/2023                 PSA                      1.3                 01/18/2022                 PSA                      1.6                 09/27/2018                                           Musculoskeletal:  Positive for arthralgias. Negative for myalgias.        Prior surgery L great toe for hallux rigidus  now with same problem R great toe.    Skin:  Positive for rash.        Pruritic rash in arms and legs using a steroid cream prescribed by his Dermatologist   Allergic/Immunologic: Negative for environmental allergies.   Neurological:  Negative for dizziness, numbness and headaches.   Hematological:  Negative for adenopathy. Does not bruise/bleed easily.   Psychiatric/Behavioral:  Negative for dysphoric mood and sleep disturbance.      Past Medical History:   Diagnosis Date    Benign colon polyp     last assessed 06Jun2016    Complete tear of anterior cruciate ligament of right knee     Gastritis      Past Surgical History:   Procedure Laterality Date    EYE SURGERY Right     detached retina OD, cataract surgery OD    FOOT SURGERY Left      Family History   Problem Relation Age of Onset    Hypertension Father      Social History     Tobacco Use    Smoking status: Never     Passive exposure: Past    Smokeless tobacco: Former     Types: Chew     Quit date: 1/1/2018   Vaping Use    Vaping status: Never Used   Substance and Sexual Activity    Alcohol use: Not Currently     Alcohol/week: 4.0 standard drinks of alcohol     Types: 4 Cans of beer per week     Comment: socially    Drug use: Never    Sexual activity: Not  "Currently     Partners: Female     Birth control/protection: Condom Male     Current Outpatient Medications on File Prior to Visit   Medication Sig    aspirin 81 MG tablet Take 1 tablet by mouth daily    Multiple Vitamin (DAILY VALUE MULTIVITAMIN) TABS Take 1 tablet by mouth daily    triamcinolone (KENALOG) 0.1 % cream APPLY TO AFFECTED AREA(S) UP TO TWICE DAILY FOR 2 WEEKS ON, 1 WEEK OFF AS NEEDED FOR FLARE UPS     No Known Allergies  Immunization History   Administered Date(s) Administered    COVID-19 PFIZER VACCINE 0.3 ML IM 05/06/2021, 05/29/2021    Tdap 07/17/2016    Zoster Vaccine Recombinant 12/22/2021, 02/24/2022     Objective       /76 (BP Location: Left arm, Patient Position: Sitting, Cuff Size: Large)   Pulse 66   Temp 97.8 °F (36.6 °C)   Resp 18   Ht 5' 7\" (1.702 m)   Wt 90.7 kg (200 lb)   SpO2 96%   BMI 31.32 kg/m²     BP Readings from Last 3 Encounters:   06/25/24 130/76   06/08/23 120/86   05/31/22 118/78      Wt Readings from Last 3 Encounters:   06/25/24 90.7 kg (200 lb)   06/08/23 89.8 kg (198 lb)   05/31/22 88.5 kg (195 lb)         Physical Exam  Constitutional:       General: He is not in acute distress.  HENT:      Right Ear: Tympanic membrane and ear canal normal.      Left Ear: Tympanic membrane and ear canal normal.   Eyes:      General: No scleral icterus.     Extraocular Movements: Extraocular movements intact.      Conjunctiva/sclera: Conjunctivae normal.      Pupils: Pupils are equal, round, and reactive to light.   Neck:      Thyroid: No thyroid mass or thyromegaly.      Vascular: Normal carotid pulses. No carotid bruit.   Cardiovascular:      Rate and Rhythm: Normal rate and regular rhythm.      Heart sounds: No murmur heard.     No gallop.   Pulmonary:      Effort: Pulmonary effort is normal.      Breath sounds: Normal breath sounds. No wheezing or rales.   Abdominal:      General: Bowel sounds are normal. There is no distension.      Palpations: Abdomen is soft. There is " no hepatomegaly, splenomegaly or mass.      Tenderness: There is no abdominal tenderness. There is no guarding or rebound.   Musculoskeletal:      Right lower leg: No edema.      Left lower leg: No edema.      Right foot: Decreased range of motion. Deformity present.      Comments: Bony enlargement right great toe MTP with loss of range of motion.  Mild hammertoe deformity of right second toe   Lymphadenopathy:      Cervical: No cervical adenopathy.   Skin:     Findings: No rash.      Comments: Excoriations lower extremities.   Neurological:      General: No focal deficit present.      Mental Status: He is alert and oriented to person, place, and time.   Psychiatric:         Mood and Affect: Mood normal.       Administrative Statements

## 2024-06-26 ENCOUNTER — TELEPHONE (OUTPATIENT)
Dept: ADMINISTRATIVE | Facility: OTHER | Age: 64
End: 2024-06-26

## 2024-06-26 NOTE — TELEPHONE ENCOUNTER
----- Message from Victor Manuel Javier MD sent at 6/25/2024  5:16 PM EDT -----  Regarding: Colon CA screening  06/25/24 5:16 PM    Hello, our patient David Hernandez has had CRC: Cologuard completed/performed. Please assist in updating the patient chart by pulling the document from Labs Tab within Chart Review. The date of service is 01/04/2024.     Thank you,  MD OSVALDO Mccain

## 2024-06-26 NOTE — TELEPHONE ENCOUNTER
Upon review of the In Basket request we were able to note that no further action is required. The patient chart is up to date as a result of a previous request.      Any additional questions or concerns should be emailed to the Practice Liaisons via the appropriate education email address, please do not reply via In Basket.    Thank you  Senait Valladares [facility]

## 2024-07-03 ENCOUNTER — APPOINTMENT (OUTPATIENT)
Dept: LAB | Facility: MEDICAL CENTER | Age: 64
End: 2024-07-03
Payer: COMMERCIAL

## 2024-07-03 DIAGNOSIS — Z12.5 SCREENING FOR PROSTATE CANCER: ICD-10-CM

## 2024-07-03 LAB — PSA SERPL-MCNC: 1.83 NG/ML (ref 0–4)

## 2024-07-03 PROCEDURE — 36415 COLL VENOUS BLD VENIPUNCTURE: CPT

## 2024-07-03 PROCEDURE — G0103 PSA SCREENING: HCPCS

## 2024-12-02 ENCOUNTER — TELEPHONE (OUTPATIENT)
Dept: FAMILY MEDICINE CLINIC | Facility: CLINIC | Age: 64
End: 2024-12-02

## 2024-12-02 NOTE — TELEPHONE ENCOUNTER
Left message for patient to call back regarding appointment on 6/27/2025. Due to a change in the providers schedule this appointment will need to be rescheduled. Dr. Javier will only be here on Monday's and Tuesday's. Due to this change his schedule is limited and he is currently scheduling into July. Patient may schedule with one of our other providers if preferred, there is just a wait to see Dr. Javier.

## 2025-01-09 ENCOUNTER — TELEPHONE (OUTPATIENT)
Age: 65
End: 2025-01-09

## 2025-01-09 NOTE — TELEPHONE ENCOUNTER
Patient wanted to let Dr. Javier know he is interested in his/second opinion about a procedure the dermatologist wants to do after biopsy.  He scheduled appointment on 2/24 as that was the first avail after his 2/5 derm appointment where everything will be discussed re: the procedure.  Patient wanted to see if Dr. Javier would mind calling him so he can fill him in a little.  Please review.

## 2025-05-26 NOTE — RESULT NOTES
Message  Lynette Patel I have enclosed a copy of your labs  Fasting blood sugar normal at 96 < 100  Cholesterol mildly elevated at 216 less than 200 normal  PSA prostate cancer screening normal at 1 5 less than 4 normal  TSH or thyroid study normal  Watch diet  Lose weight  Recheck labs in one year        Results/Data  Results    (1) LIPID PANEL, FASTING   CHOLESTEROL: 216 mg/dL Abnormal High Reference Range   TRIGLYCERIDES: 90 mg/dL Reference Range <=150  HDL,DIRECT: 70 mg/dL Abnormal High Reference Range 40-60  LDL CHOLESTEROL CALCULATED: 128 mg/dL Abnormal High Reference Range  0-100  (1) GLUCOSE,  FASTING   GLUCOSE FASTIN mg/dL Reference Range 65-99  (1) TSH   TSH: 3 210 uIU/mL Reference Range 0 358-3 740  (1) PSA (SCREEN) (Dx V76 44 Screen for Prostate Cancer)   PROSTATE SPECIFIC ANTIGEN: 1 5 ng/mL Reference Range 0 0-4 0    Signatures   Electronically signed by : RAQUEL Franklin ; 2016  1:05PM EST                       (Author)
No

## 2025-05-29 ENCOUNTER — ANESTHESIA EVENT (OUTPATIENT)
Dept: PERIOP | Facility: HOSPITAL | Age: 65
End: 2025-05-29
Payer: COMMERCIAL

## 2025-05-29 NOTE — PRE-PROCEDURE INSTRUCTIONS
Pre-Surgery Instructions:   Medication Instructions    aspirin 81 MG tablet Stop taking 7 days prior to surgery.    Multiple Vitamin (DAILY VALUE MULTIVITAMIN) TABS Stop taking 7 days prior to surgery.    triamcinolone (KENALOG) 0.1 % cream Hold day of surgery.   Medication instructions for day of surgery reviewed. Please take all instructed medications with only a sip of water.       You will receive a call one business day prior to surgery with an arrival time and hospital directions. If your surgery is scheduled on a Monday, the hospital will be calling you on the Friday prior to your surgery. If you have not heard from anyone by 8pm, please call the hospital supervisor through the hospital  at [phone]. ([place] [phone] or [place] [phone]).    Do not eat or drink anything after midnight the night before your surgery, including candy, mints, lifesavers, or chewing gum. Do not drink alcohol 24hrs before your surgery. Try not to smoke at least 24hrs before your surgery.       Follow the pre surgery showering instructions as listed in the “My Surgical Experience Booklet” or otherwise provided by your surgeon's office. Do not use a blade to shave the surgical area 1 week before surgery. It is okay to use a clean electric clippers up to 24 hours before surgery. Do not apply any lotions, creams, including makeup, cologne, deodorant, or perfumes after showering on the day of your surgery. Do not use dry shampoo, hair spray, hair gel, or any type of hair products.     No contact lenses, eye make-up, or artificial eyelashes. Remove nail polish, including gel polish, and any artificial, gel, or acrylic nails if possible. Remove all jewelry including rings and body piercing jewelry.     Wear causal clothing that is easy to take on and off. Consider your type of surgery.    Keep any valuables, jewelry, piercings at home. Please bring any specially ordered equipment (sling, braces) if  indicated.    Arrange for a responsible person to drive you to and from the hospital on the day of your surgery. Please confirm the visitor policy for the day of your procedure when you receive your phone call with an arrival time.     Call the surgeon's office with any new illnesses, exposures, or additional questions prior to surgery.    Please reference your “My Surgical Experience Booklet” for additional information to prepare for your upcoming surgery.

## 2025-06-03 ENCOUNTER — CONSULT (OUTPATIENT)
Dept: FAMILY MEDICINE CLINIC | Facility: CLINIC | Age: 65
End: 2025-06-03
Payer: COMMERCIAL

## 2025-06-03 VITALS
DIASTOLIC BLOOD PRESSURE: 80 MMHG | TEMPERATURE: 97.6 F | OXYGEN SATURATION: 98 % | SYSTOLIC BLOOD PRESSURE: 144 MMHG | WEIGHT: 197 LBS | HEIGHT: 66 IN | BODY MASS INDEX: 31.66 KG/M2 | HEART RATE: 69 BPM

## 2025-06-03 DIAGNOSIS — Z01.818 PRE-OP EXAMINATION: Primary | ICD-10-CM

## 2025-06-03 DIAGNOSIS — M20.21 HALLUX RIGIDUS OF RIGHT FOOT: ICD-10-CM

## 2025-06-03 PROCEDURE — 99214 OFFICE O/P EST MOD 30 MIN: CPT | Performed by: FAMILY MEDICINE

## 2025-06-03 RX ORDER — CLOBETASOL PROPIONATE 0.5 MG/G
CREAM TOPICAL
COMMUNITY
Start: 2025-05-30

## 2025-06-03 RX ORDER — SUZETRIGINE 50 MG/1
TABLET, FILM COATED ORAL
COMMUNITY
Start: 2025-05-16

## 2025-06-03 NOTE — H&P (VIEW-ONLY)
Pre-operative Clearance  Name: David Hernandez      : 1960      MRN: [id number]  Encounter Provider: Zi Diaz MD  Encounter Date: 6/3/2025   Encounter department: [facility]    :  Assessment & Plan  Pre-op examination         Hallux rigidus of right foot             Pre-operative Clearance:     Revised Cardiac Risk Index:  RCI RISK CLASS I (0 risk factors, risk of major cardiac complications approximately 0.5%)    Clearance:  Patient is medically optimized (CLEARED) for proposed surgery without any additional cardiac testing.      Medication Instructions:   - Avoid herbs or non-directed vitamins one week prior to surgery    - Avoid aspirin containing medications or non-steroidal anti-inflammatory drugs one week preceding surgery         History of Present Illness     Pre-Op Examination     Surgery: First MPJ fusion right foot   Anticipated Date of Surgery: 2025   Surgeon: Dr. Carrero     Previous history of bleeding disorders or clots?: No    Previous Anesthesia reaction?: No    Prolonged steroid use in the last 6 months?: No      Assessment of Cardiac Risk:   - Unstable or severe angina or MI in the last 6 weeks or history of stent placement in the last year?: No    - Decompensated heart failure (e.g. New onset heart failure, NYHA  Class IV heart failure, or worsening existing heart failure)?: No    - Significant arrhythmias such as high grade AV block, symptomatic ventricular arrhythmia, newly recognized ventricular tachycardia, supraventricular tachycardia with resting heart rate >100, or symptomatic bradycardia?: No    - Severe heart valve disease including aortic stenosis or symptomatic mitral stenosis?: No      Pre-operative Risk Factors:  - Elevated-risk surgery: No    - History of cerebrovascular disease: No    - History of ischemic heart disease: No    - History of congestive heart failure: No    - Pre-operative treatment with insulin: No    - Pre-operative  "creatinine >2 mg/dL: No      Medications of Perioperative Concern:    Anti-platelet (aspirin, clopidogrel, ticagrelor, prasugrel, cilostazol, dipyridamole)    Review of Systems   Constitutional:  Negative for activity change, fatigue and fever.   Eyes:  Negative for visual disturbance.   Respiratory:  Negative for shortness of breath.    Cardiovascular:  Negative for chest pain.   Gastrointestinal:  Negative for abdominal pain, constipation, diarrhea and nausea.   Endocrine: Negative for cold intolerance and heat intolerance.   Musculoskeletal:  Negative for back pain.   Skin:  Negative for rash.   Neurological:  Negative for headaches.   Psychiatric/Behavioral:  Negative for confusion.      Past Medical History   Past Medical History[1]  Past Surgical History[2]  Family History[3]  Social History[4]  Medications[5]  No Known Allergies  Objective   /80 (BP Location: Left arm, Patient Position: Sitting, Cuff Size: Large)   Pulse 69   Temp 97.6 °F (36.4 °C) (Skin)   Ht 5' 6\" (1.676 m)   Wt 89.4 kg (197 lb)   SpO2 98%   BMI 31.80 kg/m²     Physical Exam  Vitals and nursing note reviewed.   Constitutional:       Appearance: Normal appearance. He is well-developed.   HENT:      Head: Normocephalic and atraumatic.     Cardiovascular:      Rate and Rhythm: Normal rate and regular rhythm.   Pulmonary:      Effort: Pulmonary effort is normal.      Breath sounds: Normal breath sounds.   Abdominal:      General: Bowel sounds are normal.      Palpations: Abdomen is soft.     Musculoskeletal:      Cervical back: Normal range of motion.     Skin:     General: Skin is warm.     Neurological:      General: No focal deficit present.      Mental Status: He is alert.     Psychiatric:         Mood and Affect: Mood normal.         Speech: Speech normal.           Zi Diaz MD         [1]   Past Medical History:  Diagnosis Date    BCC (basal cell carcinoma of skin)     left cheek in past    Benign colon polyp  "    last assessed 06Jun2016    Gastritis     Pain in toe of right foot    [2]   Past Surgical History:  Procedure Laterality Date    COLONOSCOPY      EYE SURGERY Right     detached retina OD, cataract surgery OD    FOOT SURGERY Left     great toe   [3]   Family History  Problem Relation Name Age of Onset    Hypertension Father Artis    [4]   Social History  Tobacco Use    Smoking status: Never     Passive exposure: Past    Smokeless tobacco: Former     Types: Chew     Quit date: 1/1/2018   Vaping Use    Vaping status: Never Used   Substance and Sexual Activity    Alcohol use: Not Currently     Alcohol/week: 4.0 standard drinks of alcohol     Types: 4 Cans of beer per week     Comment: socially    Drug use: Never    Sexual activity: Not Currently     Partners: Female     Birth control/protection: Condom Male   [5]   Current Outpatient Medications on File Prior to Visit   Medication Sig    aspirin 81 MG tablet Take 1 tablet by mouth in the morning.    clobetasol (TEMOVATE) 0.05 % cream     Multiple Vitamin (DAILY VALUE MULTIVITAMIN) TABS Take 1 tablet by mouth in the morning.    triamcinolone (KENALOG) 0.1 % cream as needed    Journavx 50 MG TABS TAKE 2 TABLETS BY MOUTH AS ONE DOSE, THEN 1 TABLET EVERY 12 HOURS FOR 5 DAYS

## 2025-06-03 NOTE — PROGRESS NOTES
Pre-operative Clearance  Name: David Hernandez      : 1960      MRN: [id number]  Encounter Provider: Zi Diaz MD  Encounter Date: 6/3/2025   Encounter department: [facility]    :  Assessment & Plan  Pre-op examination         Hallux rigidus of right foot             Pre-operative Clearance:     Revised Cardiac Risk Index:  RCI RISK CLASS I (0 risk factors, risk of major cardiac complications approximately 0.5%)    Clearance:  Patient is medically optimized (CLEARED) for proposed surgery without any additional cardiac testing.      Medication Instructions:   - Avoid herbs or non-directed vitamins one week prior to surgery    - Avoid aspirin containing medications or non-steroidal anti-inflammatory drugs one week preceding surgery         History of Present Illness     Pre-Op Examination     Surgery: First MPJ fusion right foot   Anticipated Date of Surgery: 2025   Surgeon: Dr. Carrero     Previous history of bleeding disorders or clots?: No    Previous Anesthesia reaction?: No    Prolonged steroid use in the last 6 months?: No      Assessment of Cardiac Risk:   - Unstable or severe angina or MI in the last 6 weeks or history of stent placement in the last year?: No    - Decompensated heart failure (e.g. New onset heart failure, NYHA  Class IV heart failure, or worsening existing heart failure)?: No    - Significant arrhythmias such as high grade AV block, symptomatic ventricular arrhythmia, newly recognized ventricular tachycardia, supraventricular tachycardia with resting heart rate >100, or symptomatic bradycardia?: No    - Severe heart valve disease including aortic stenosis or symptomatic mitral stenosis?: No      Pre-operative Risk Factors:  - Elevated-risk surgery: No    - History of cerebrovascular disease: No    - History of ischemic heart disease: No    - History of congestive heart failure: No    - Pre-operative treatment with insulin: No    - Pre-operative  "creatinine >2 mg/dL: No      Medications of Perioperative Concern:    Anti-platelet (aspirin, clopidogrel, ticagrelor, prasugrel, cilostazol, dipyridamole)    Review of Systems   Constitutional:  Negative for activity change, fatigue and fever.   Eyes:  Negative for visual disturbance.   Respiratory:  Negative for shortness of breath.    Cardiovascular:  Negative for chest pain.   Gastrointestinal:  Negative for abdominal pain, constipation, diarrhea and nausea.   Endocrine: Negative for cold intolerance and heat intolerance.   Musculoskeletal:  Negative for back pain.   Skin:  Negative for rash.   Neurological:  Negative for headaches.   Psychiatric/Behavioral:  Negative for confusion.      Past Medical History   Past Medical History[1]  Past Surgical History[2]  Family History[3]  Social History[4]  Medications[5]  No Known Allergies  Objective   /80 (BP Location: Left arm, Patient Position: Sitting, Cuff Size: Large)   Pulse 69   Temp 97.6 °F (36.4 °C) (Skin)   Ht 5' 6\" (1.676 m)   Wt 89.4 kg (197 lb)   SpO2 98%   BMI 31.80 kg/m²     Physical Exam  Vitals and nursing note reviewed.   Constitutional:       Appearance: Normal appearance. He is well-developed.   HENT:      Head: Normocephalic and atraumatic.     Cardiovascular:      Rate and Rhythm: Normal rate and regular rhythm.   Pulmonary:      Effort: Pulmonary effort is normal.      Breath sounds: Normal breath sounds.   Abdominal:      General: Bowel sounds are normal.      Palpations: Abdomen is soft.     Musculoskeletal:      Cervical back: Normal range of motion.     Skin:     General: Skin is warm.     Neurological:      General: No focal deficit present.      Mental Status: He is alert.     Psychiatric:         Mood and Affect: Mood normal.         Speech: Speech normal.           Zi Diaz MD         [1]   Past Medical History:  Diagnosis Date    BCC (basal cell carcinoma of skin)     left cheek in past    Benign colon polyp  "    last assessed 06Jun2016    Gastritis     Pain in toe of right foot    [2]   Past Surgical History:  Procedure Laterality Date    COLONOSCOPY      EYE SURGERY Right     detached retina OD, cataract surgery OD    FOOT SURGERY Left     great toe   [3]   Family History  Problem Relation Name Age of Onset    Hypertension Father Artis    [4]   Social History  Tobacco Use    Smoking status: Never     Passive exposure: Past    Smokeless tobacco: Former     Types: Chew     Quit date: 1/1/2018   Vaping Use    Vaping status: Never Used   Substance and Sexual Activity    Alcohol use: Not Currently     Alcohol/week: 4.0 standard drinks of alcohol     Types: 4 Cans of beer per week     Comment: socially    Drug use: Never    Sexual activity: Not Currently     Partners: Female     Birth control/protection: Condom Male   [5]   Current Outpatient Medications on File Prior to Visit   Medication Sig    aspirin 81 MG tablet Take 1 tablet by mouth in the morning.    clobetasol (TEMOVATE) 0.05 % cream     Multiple Vitamin (DAILY VALUE MULTIVITAMIN) TABS Take 1 tablet by mouth in the morning.    triamcinolone (KENALOG) 0.1 % cream as needed    Journavx 50 MG TABS TAKE 2 TABLETS BY MOUTH AS ONE DOSE, THEN 1 TABLET EVERY 12 HOURS FOR 5 DAYS

## 2025-06-09 ENCOUNTER — APPOINTMENT (OUTPATIENT)
Dept: RADIOLOGY | Facility: HOSPITAL | Age: 65
End: 2025-06-09
Payer: COMMERCIAL

## 2025-06-09 ENCOUNTER — ANESTHESIA (OUTPATIENT)
Dept: PERIOP | Facility: HOSPITAL | Age: 65
End: 2025-06-09
Payer: COMMERCIAL

## 2025-06-09 ENCOUNTER — HOSPITAL ENCOUNTER (OUTPATIENT)
Facility: HOSPITAL | Age: 65
Setting detail: OUTPATIENT SURGERY
Discharge: HOME | End: 2025-06-09
Attending: PODIATRIST | Admitting: PODIATRIST
Payer: COMMERCIAL

## 2025-06-09 VITALS
HEART RATE: 54 BPM | RESPIRATION RATE: 15 BRPM | HEIGHT: 67 IN | BODY MASS INDEX: 31.39 KG/M2 | TEMPERATURE: 96.5 F | SYSTOLIC BLOOD PRESSURE: 134 MMHG | WEIGHT: 200 LBS | DIASTOLIC BLOOD PRESSURE: 63 MMHG | OXYGEN SATURATION: 94 %

## 2025-06-09 DIAGNOSIS — Z98.890 POST-OPERATIVE STATE: Primary | ICD-10-CM

## 2025-06-09 PROCEDURE — C1713 ANCHOR/SCREW BN/BN,TIS/BN: HCPCS | Performed by: PODIATRIST

## 2025-06-09 PROCEDURE — 73620 X-RAY EXAM OF FOOT: CPT

## 2025-06-09 DEVICE — LOCKING SCREW, FULLY THREADED,T8
Type: IMPLANTABLE DEVICE | Site: FOOT | Status: FUNCTIONAL
Brand: VARIAX

## 2025-06-09 DEVICE — BIOACTIVE BONE GRAFT SUBSTITUTE, FOAM PACK; BETA-TRICALCIUM PHOSPHATE, TYPE I BOVINE COLLAGEN, AND BIOACTIVE GLASS
Type: IMPLANTABLE DEVICE | Site: FOOT | Status: FUNCTIONAL
Brand: VITOSS BA2X

## 2025-06-09 DEVICE — CP LAG SCREW (T8)
Type: IMPLANTABLE DEVICE | Site: FOOT | Status: FUNCTIONAL
Brand: ANCHORAGE

## 2025-06-09 DEVICE — POLYAXIAL LOCKING PLATE -  MTP CROSS-PLATE, RIGHT (T8)
Type: IMPLANTABLE DEVICE | Site: FOOT | Status: FUNCTIONAL
Brand: ANCHORAGE

## 2025-06-09 RX ORDER — CHLORHEXIDINE GLUCONATE ORAL RINSE 1.2 MG/ML
15 SOLUTION DENTAL EVERY 12 HOURS SCHEDULED
Status: COMPLETED | OUTPATIENT
Start: 2025-06-09 | End: 2025-06-09

## 2025-06-09 RX ORDER — ONDANSETRON 2 MG/ML
4 INJECTION INTRAMUSCULAR; INTRAVENOUS ONCE AS NEEDED
Status: DISCONTINUED | OUTPATIENT
Start: 2025-06-09 | End: 2025-06-09 | Stop reason: HOSPADM

## 2025-06-09 RX ORDER — MIDAZOLAM HYDROCHLORIDE 2 MG/2ML
INJECTION, SOLUTION INTRAMUSCULAR; INTRAVENOUS AS NEEDED
Status: DISCONTINUED | OUTPATIENT
Start: 2025-06-09 | End: 2025-06-09

## 2025-06-09 RX ORDER — FENTANYL CITRATE 50 UG/ML
INJECTION, SOLUTION INTRAMUSCULAR; INTRAVENOUS AS NEEDED
Status: DISCONTINUED | OUTPATIENT
Start: 2025-06-09 | End: 2025-06-09

## 2025-06-09 RX ORDER — KETOROLAC TROMETHAMINE 30 MG/ML
INJECTION, SOLUTION INTRAMUSCULAR; INTRAVENOUS AS NEEDED
Status: DISCONTINUED | OUTPATIENT
Start: 2025-06-09 | End: 2025-06-09

## 2025-06-09 RX ORDER — ACETAMINOPHEN 325 MG/1
650 TABLET ORAL EVERY 6 HOURS PRN
Status: DISCONTINUED | OUTPATIENT
Start: 2025-06-09 | End: 2025-06-09 | Stop reason: HOSPADM

## 2025-06-09 RX ORDER — SODIUM CHLORIDE, SODIUM LACTATE, POTASSIUM CHLORIDE, CALCIUM CHLORIDE 600; 310; 30; 20 MG/100ML; MG/100ML; MG/100ML; MG/100ML
125 INJECTION, SOLUTION INTRAVENOUS CONTINUOUS
Status: DISCONTINUED | OUTPATIENT
Start: 2025-06-09 | End: 2025-06-09 | Stop reason: HOSPADM

## 2025-06-09 RX ORDER — DEXAMETHASONE SODIUM PHOSPHATE 10 MG/ML
INJECTION, SOLUTION INTRAMUSCULAR; INTRAVENOUS AS NEEDED
Status: DISCONTINUED | OUTPATIENT
Start: 2025-06-09 | End: 2025-06-09

## 2025-06-09 RX ORDER — ACETAMINOPHEN 10 MG/ML
INJECTION, SOLUTION INTRAVENOUS AS NEEDED
Status: DISCONTINUED | OUTPATIENT
Start: 2025-06-09 | End: 2025-06-09

## 2025-06-09 RX ORDER — FENTANYL CITRATE 50 UG/ML
50 INJECTION, SOLUTION INTRAMUSCULAR; INTRAVENOUS
Status: DISCONTINUED | OUTPATIENT
Start: 2025-06-09 | End: 2025-06-09 | Stop reason: HOSPADM

## 2025-06-09 RX ORDER — BUPIVACAINE HYDROCHLORIDE 5 MG/ML
INJECTION, SOLUTION EPIDURAL; INTRACAUDAL; PERINEURAL AS NEEDED
Status: DISCONTINUED | OUTPATIENT
Start: 2025-06-09 | End: 2025-06-09 | Stop reason: HOSPADM

## 2025-06-09 RX ORDER — PROPOFOL 10 MG/ML
INJECTION, EMULSION INTRAVENOUS AS NEEDED
Status: DISCONTINUED | OUTPATIENT
Start: 2025-06-09 | End: 2025-06-09

## 2025-06-09 RX ORDER — OXYCODONE AND ACETAMINOPHEN 5; 325 MG/1; MG/1
1 TABLET ORAL EVERY 4 HOURS PRN
Qty: 20 TABLET | Refills: 0 | Status: SHIPPED | OUTPATIENT
Start: 2025-06-09

## 2025-06-09 RX ORDER — HYDROMORPHONE HCL/PF 1 MG/ML
0.5 SYRINGE (ML) INJECTION
Status: DISCONTINUED | OUTPATIENT
Start: 2025-06-09 | End: 2025-06-09 | Stop reason: HOSPADM

## 2025-06-09 RX ORDER — KETAMINE HCL IN NACL, ISO-OSM 100MG/10ML
SYRINGE (ML) INJECTION AS NEEDED
Status: DISCONTINUED | OUTPATIENT
Start: 2025-06-09 | End: 2025-06-09

## 2025-06-09 RX ORDER — ONDANSETRON 2 MG/ML
INJECTION INTRAMUSCULAR; INTRAVENOUS AS NEEDED
Status: DISCONTINUED | OUTPATIENT
Start: 2025-06-09 | End: 2025-06-09

## 2025-06-09 RX ORDER — CEFAZOLIN SODIUM 2 G/50ML
2000 SOLUTION INTRAVENOUS ONCE
Status: COMPLETED | OUTPATIENT
Start: 2025-06-09 | End: 2025-06-09

## 2025-06-09 RX ORDER — LIDOCAINE HYDROCHLORIDE 20 MG/ML
INJECTION, SOLUTION EPIDURAL; INFILTRATION; INTRACAUDAL; PERINEURAL AS NEEDED
Status: DISCONTINUED | OUTPATIENT
Start: 2025-06-09 | End: 2025-06-09 | Stop reason: HOSPADM

## 2025-06-09 RX ADMIN — DEXMEDETOMIDINE HYDROCHLORIDE 8 MCG: 100 INJECTION, SOLUTION INTRAVENOUS at 12:42

## 2025-06-09 RX ADMIN — CEFAZOLIN SODIUM 2000 MG: 2 SOLUTION INTRAVENOUS at 12:40

## 2025-06-09 RX ADMIN — KETOROLAC TROMETHAMINE 30 MG: 30 INJECTION, SOLUTION INTRAMUSCULAR at 14:08

## 2025-06-09 RX ADMIN — Medication 25 MG: at 13:16

## 2025-06-09 RX ADMIN — FENTANYL CITRATE 25 MCG: 50 INJECTION, SOLUTION INTRAMUSCULAR; INTRAVENOUS at 12:38

## 2025-06-09 RX ADMIN — FENTANYL CITRATE 25 MCG: 50 INJECTION, SOLUTION INTRAMUSCULAR; INTRAVENOUS at 13:07

## 2025-06-09 RX ADMIN — DEXAMETHASONE SODIUM PHOSPHATE 10 MG: 10 INJECTION, SOLUTION INTRAMUSCULAR; INTRAVENOUS at 12:45

## 2025-06-09 RX ADMIN — MIDAZOLAM 2 MG: 1 INJECTION INTRAMUSCULAR; INTRAVENOUS at 12:29

## 2025-06-09 RX ADMIN — SODIUM CHLORIDE, SODIUM LACTATE, POTASSIUM CHLORIDE, AND CALCIUM CHLORIDE 125 ML/HR: .6; .31; .03; .02 INJECTION, SOLUTION INTRAVENOUS at 12:02

## 2025-06-09 RX ADMIN — DEXMEDETOMIDINE HYDROCHLORIDE 12 MCG: 100 INJECTION, SOLUTION INTRAVENOUS at 12:30

## 2025-06-09 RX ADMIN — FENTANYL CITRATE 50 MCG: 50 INJECTION, SOLUTION INTRAMUSCULAR; INTRAVENOUS at 14:09

## 2025-06-09 RX ADMIN — FENTANYL CITRATE 25 MCG: 50 INJECTION, SOLUTION INTRAMUSCULAR; INTRAVENOUS at 13:16

## 2025-06-09 RX ADMIN — ACETAMINOPHEN 1000 MG: 10 INJECTION INTRAVENOUS at 12:57

## 2025-06-09 RX ADMIN — PROPOFOL 90 MCG/KG/MIN: 10 INJECTION, EMULSION INTRAVENOUS at 12:39

## 2025-06-09 RX ADMIN — CHLORHEXIDINE GLUCONATE 0.12% ORAL RINSE 15 ML: 1.2 LIQUID ORAL at 11:54

## 2025-06-09 RX ADMIN — FENTANYL CITRATE 25 MCG: 50 INJECTION, SOLUTION INTRAMUSCULAR; INTRAVENOUS at 12:44

## 2025-06-09 RX ADMIN — ONDANSETRON 4 MG: 2 INJECTION INTRAMUSCULAR; INTRAVENOUS at 12:29

## 2025-06-09 RX ADMIN — PROPOFOL 50 MG: 10 INJECTION, EMULSION INTRAVENOUS at 12:38

## 2025-06-09 RX ADMIN — FENTANYL CITRATE 25 MCG: 50 INJECTION, SOLUTION INTRAMUSCULAR; INTRAVENOUS at 13:03

## 2025-06-09 RX ADMIN — FENTANYL CITRATE 25 MCG: 50 INJECTION, SOLUTION INTRAMUSCULAR; INTRAVENOUS at 13:12

## 2025-06-09 NOTE — NURSING NOTE
Denied pain prior to discharge. Dressing/ace wrap clean, dry and intact. No drainage. NWB to right foot. Surgical shoe dispensed. Left via wheelchair with PCA. No distress.

## 2025-06-09 NOTE — ANESTHESIA PREPROCEDURE EVALUATION
Procedure:  1ST MPJ FUSION (Right: Foot)    Relevant Problems   /RENAL   (+) Nodular prostate without lower urinary tract symptoms        Physical Exam    Airway     Mallampati score: II  TM Distance: >3 FB  Neck ROM: full      Cardiovascular  Cardiovascular exam normal    Dental   Comment: Denies loose teeth     Pulmonary  Pulmonary exam normal     Neurological    He appears awake, alert and oriented x3.      Other Findings        Anesthesia Plan  ASA Score- 2     Anesthesia Type- IV sedation with anesthesia with ASA Monitors.         Additional Monitors:     Airway Plan:            Plan Factors-Exercise tolerance (METS): >4 METS.    Chart reviewed.   Existing labs reviewed. Patient summary reviewed.    Patient is not a current smoker.              Induction- intravenous.    Postoperative Plan- .   Monitoring Plan - Monitoring plan - standard ASA monitoring          Informed Consent- Anesthetic plan and risks discussed with patient.  I personally reviewed this patient with the CRNA. Discussed and agreed on the Anesthesia Plan with the CRNA..      NPO Status:  No vitals data found for the desired time range.

## 2025-06-09 NOTE — INTERVAL H&P NOTE
H&P reviewed. After examining the patient I find no changes in the patients condition since the H&P had been written.    Vitals:    06/09/25 1208   BP: 168/96   Pulse: 61   Resp: 16   Temp:    SpO2: 98   Pt seen and examined. Stable for planned anesthetic.    FUAD Morillo MD

## 2025-06-09 NOTE — ANESTHESIA POSTPROCEDURE EVALUATION
Post-Op Assessment Note    CV Status:  Stable    Pain management: adequate       Mental Status:  Alert and awake   Hydration Status:  Euvolemic   PONV Controlled:  Controlled   Airway Patency:  Patent     Post Op Vitals Reviewed: Yes    No anethesia notable event occurred.            Last Filed PACU Vitals:  Vitals Value Taken Time   Temp     Pulse 55 06/09/25 14:54   /85 06/09/25 14:50   Resp 12 06/09/25 14:54   SpO2 94 % 06/09/25 14:54   Vitals shown include unfiled device data.    Modified Topher:     Vitals Value Taken Time   Activity 2 06/09/25 14:45   Respiration 2 06/09/25 14:45   Circulation 2 06/09/25 14:45   Consciousness 2 06/09/25 14:45   Oxygen Saturation 2 06/09/25 14:45     Modified Topher Score: 10

## 2025-06-09 NOTE — ANESTHESIA POSTPROCEDURE EVALUATION
Post-Op Assessment Note    CV Status:  Stable    Pain management: adequate       Mental Status:  Alert and awake   Hydration Status:  Euvolemic   PONV Controlled:  Controlled   Airway Patency:  Patent     Post Op Vitals Reviewed: Yes    No anethesia notable event occurred.    Staff: CRNA           Last Filed PACU Vitals:  Vitals Value Taken Time   Temp 97.9    Pulse 56 06/09/25 14:35   /75    Resp 17 06/09/25 14:35   SpO2 97 06/09/25 14:35   Vitals shown include unfiled device data.

## 2025-06-09 NOTE — NURSING NOTE
Awake and alert. Denies pain. Dressing/ace wrap clean, dry and intact. No drainage. Toes pink, warm to touch, positive movement/sensation with brisk capillary refill.  Stated that there is numbness.

## 2025-06-09 NOTE — OP NOTE
OPERATIVE REPORT  PATIENT NAME: David Hernandez    :  1960  MRN: [id number]  Pt Location:  OR ROOM 10    SURGERY DATE: 2025    Surgeons and Role:     * Mayco Carrero DPM - Primary     * Yuri Kulkarni DPM - Assisting    Preop Diagnosis:  Hallux rigidus, right foot [M20.21]  Pain in right foot [M79.671]    Post-Op Diagnosis Codes:     * Hallux rigidus, right foot [M20.21]     * Pain in right foot [M79.671]    Procedure(s):  Right - 1ST MPJ FUSION    Specimen(s):  * No specimens in log *    Estimated Blood Loss:   Minimal    Drains:  * No LDAs found *    Anesthesia Type:   IV Sedation with Anesthesia    Operative Indications:  Hallux rigidus, right foot [M20.21]  Pain in right foot [M79.671]      Operative Findings:  Consistent with above       Complications:   None    Procedure and Technique:  Under mild sedation, the patient was brought into the operating room and placed on the operating room table in the supine position. IV sedation was achieved by anesthesia team and a universal timeout was performed where all parties are in agreement of correct patient, correct procedure and correct site. A pneumatic tourniquet was then placed over the patient's right lower extremity with ample padding. A beaver block was performed consisting of 10ml of local anesthetic. The foot was then prepped and draped in the usual aseptic manner. An esmarch bandage was used to exsangunate the foot and the pneumatic tourniquet was then inflated to 250 mmHg.     Attention was then directed to dorsomedial aspect of the right foot where a dorsolinear incision was made in line approximately 6cm. This incision was deepened to subcutaneous tissue with a sterile 15 blade. All vital neurovascular structures were identified and then retracted, all bleeders were identified and cauterized. The EHL was then identified and retracted laterally. A capsular incision was then made and all capsular and periosteal structures were reflected  off of the 1st metatarsal head and the base of the proximal phalanx, all soft tissue attachments were reflected off the 1st metatarsal head and base of the proximal phalanx. Upon visualization of the MTPJ, it was noted that there was mild cartilage destruction to the 1st metatarsal head.       A cannulated cup and cone reamer was then utilized to remove all cartilaginous surfaces from the 1st metatarsal head as well as the 1st proximal phalanx base.  The area in the 1st proximal phalanx base where the implant stem was, was curetted to remove any devitalized tissue. A drill bit was used for subchondral drilling in the head with 1st metatarsal and base of proximal phalanx. Adequate reduction and position of the joint was visualized and assured using C-arm and held in place with a temporary K-wire from base of the medial proximal phalanx base to lateral metatarsal head proximally.      With the joint now stationary, the MTP CP temporary plate was placed over joint and evaluated by c-arm to confirm proper positioning. A k-wire was then inserted through the k-wire guide on the plate.  Once the k-wire was positioned, the drill guide and template were removed.The CP reamer was advanced over the K-wire until the appropriate depth was achieved. The polyaxial MTP plate was positioned and temporary fixated. The proximal holes were then drilled and fixated with 2 locking screws. With use of the CP MTP drill guide, the plantar cortex of the proximal phalanx was drilled. All temporary fixation was removed and a 3.6 x 26mm lag screw was placed through the CP hole, position confirmed via c-arm in multiple planes. Adequate compression of MTP joint was confirmed on c-arm. The distal holes were then drilled and two distal locking screws were placed  The length and position was confirmed on C-arm and noted to be excellent; confirmed radiologically and clinically.  The V-toss was then added within the fusion site     The periosteal and  capsular structures were reapproximated using 3-0 Vicryl. Subcutaneous closure was obtained utilizing 4-0 Vicryl. Skin edges were reapproximated and closure was obtained utilizing 4-0  preolene. The foot was then cleansed and dried. A postoperative injection consisting of 10 ml of 0.5% Bupivacaine was performed. The incision site was dressed with Xeroform and a dry sterile dressing.      The tourniquet was deflated and normal hyperemic flush was noted to all digits. The patient tolerated the procedure and anesthesia well and was transported to the PACU with vital signs stable.      Dr. Carrero was present during the entire procedure and participated in all key aspects.  Patient Disposition:  PACU          SIGNATURE: Yuri Kulkarni DPM  DATE: June 9, 2025  TIME: 2:35 PM

## 2025-06-09 NOTE — DISCHARGE INSTR - AVS FIRST PAGE
Dr. Carrero  Post-Operative Instructions    1. Take your prescribed medication as needed. You can take ibuprofen in between doses of the narcotic if needed.   2. Upon arrival at home, lie down and elevate your surgical foot on 2 pillows.  3. Remain quiet, off your feet as much as possible, for the first 24-48 hours. This is when your feet first swell and may become painful. After 48 hours you may begin limited walking following these restrictions: non weight bearing to the right lower extremity      4. Drink large quantities of water. Consume no alcohol. Continue a well-balanced diet.  5. Report any unusual discomfort or fever to this office.  6. A limited amount of discomfort and swelling is to be expected. In some cases the skin may take on a bruised appearance. The surgical solution that was applied to your foot prior to the operation is dark in color and the operation site may appear to be oozing when it actually is not.  7. A slight amount of blood is to be expected, and is no cause for alarm. Do not remove the dressings. If there is active bleeding and if the bleeding persists, add additional gauze to the bandage, apply direct pressure, elevate your feet and call this office.  8. Do not get the dressings wet. As regular bathing may be inconvenient, sponge baths are recommended. If you shower, make sure the dressing stays dry.   9. When anesthesia wears off and if any discomfort should be present, apply an ice pack directly over the operated area for 15 minute intervals for several hours or until the pain leaves. (USE IN EXCESS OF 15 MINUTES COULD CAUSE FROSTBITE). Do not use hot water bags or electric pads. A convenient icepack can be made by placing ice cubes in a plastic bag and covering this with a towel.  10. If necessary, take a mild laxative before retiring.  11. Wear your special open shoes anytime you put weight on your foot, even if it is just to walk to the bathroom and back. It will probably be 2  or 3 weeks before you will be permitted to try regular shoes.  12. Having performed the operation, we are interested in a prompt recovery. Please cooperate by following the above instructions.  13. Please call to confirm your post-op appointment or call with any other questions.

## 2025-07-01 ENCOUNTER — OFFICE VISIT (OUTPATIENT)
Dept: FAMILY MEDICINE CLINIC | Facility: CLINIC | Age: 65
End: 2025-07-01
Payer: COMMERCIAL

## 2025-07-01 VITALS
WEIGHT: 197.5 LBS | BODY MASS INDEX: 31 KG/M2 | DIASTOLIC BLOOD PRESSURE: 82 MMHG | RESPIRATION RATE: 18 BRPM | HEART RATE: 67 BPM | SYSTOLIC BLOOD PRESSURE: 138 MMHG | TEMPERATURE: 97.5 F | HEIGHT: 67 IN | OXYGEN SATURATION: 99 %

## 2025-07-01 DIAGNOSIS — Z00.00 ANNUAL PHYSICAL EXAM: Primary | ICD-10-CM

## 2025-07-01 DIAGNOSIS — Z12.5 SCREENING FOR PROSTATE CANCER: ICD-10-CM

## 2025-07-01 PROCEDURE — 99396 PREV VISIT EST AGE 40-64: CPT

## 2025-07-01 NOTE — PROGRESS NOTES
Adult Annual Physical  Name: David Hernandez      : 1960      MRN: [id number]  Encounter Provider: Reyes Macias DO  Encounter Date: 2025   Encounter department: [facility]    :  Assessment & Plan  Annual physical exam  - screening for cardiovascular disease: had labs completed through work - reports that they are WNL - no records to review  - screening for colon cancer: Cologuard 2024 was negative  - immunizations: Recommend pneumonia vaccine, pt would liek to think about it, info provided  - psa: Within normal limits  - smoking hx: nonsmoker   - counseled pt on lifestyle modifications such as diet changes and 150 mins/weekly of moderate intensity exercise        Screening for prostate cancer    Orders:    PSA Total (Reflex To Free); Future        Preventive Screenings:  - Diabetes Screening: risks/benefits discussed  - Cholesterol Screening: risks/benefits discussed   - Hepatitis C screening: screening up-to-date   - Colon cancer screening: screening up-to-date   - Lung cancer screening: screening not indicated   - Prostate cancer screening: screening up-to-date     Immunizations:  - Immunizations due: Prevnar 20    Counseling/Anticipatory Guidance:    - Dental health: discussed importance of regular tooth brushing, flossing, and dental visits.   - Diet: discussed recommendations for a healthy/well-balanced diet.   - Exercise: the importance of regular exercise/physical activity was discussed. Recommend exercise 3-5 times per week for at least 30 minutes.   - Injury prevention: discussed safety/seat belts, safety helmets, smoke detectors, carbon monoxide detectors, and smoking near bedding or upholstery.          History of Present Illness     Adult Annual Physical:  Patient presents for annual physical. S/p 1st MPJ fusion of R great toe, doing well, continues to follow with podiatry. .     Diet and Physical Activity:  - Diet/Nutrition: no special diet.  - Exercise: vigorous  "cardiovascular exercise, 3-4 times a week on average and 1-2 hours on average.    Depression Screening:  - PHQ-2 Score: 0    General Health:  - Sleep: sleeps well and 4-6 hours of sleep on average.  - Hearing: normal hearing bilateral ears.  - Vision: wears glasses. I WEAR READERS  - Dental: regular dental visits, brushes teeth once daily and floss regularly.     Health:  - History of STDs: no.   - Urinary symptoms: none.     Advanced Care Planning:  - Has an advanced directive?: no    - Has a durable medical POA?: no      Review of Systems   Constitutional:  Negative for chills and fever.   HENT:  Negative for congestion and rhinorrhea.    Eyes:  Negative for visual disturbance.   Respiratory:  Negative for cough and shortness of breath.    Cardiovascular:  Negative for chest pain and palpitations.   Gastrointestinal:  Negative for abdominal pain, constipation, diarrhea, nausea and vomiting.   Genitourinary:  Negative for dysuria.   Musculoskeletal:  Negative for arthralgias.   Skin:  Negative for rash.   Neurological:  Negative for dizziness, light-headedness and headaches.     Medications Ordered Prior to Encounter[1]     Objective   /82 (BP Location: Left arm, Patient Position: Sitting, Cuff Size: Standard)   Pulse 67   Temp 97.5 °F (36.4 °C) (Temporal)   Resp 18   Ht 5' 7\" (1.702 m)   Wt 89.6 kg (197 lb 8 oz)   SpO2 99%   BMI 30.93 kg/m²     Physical Exam  Vitals reviewed.   Constitutional:       General: He is not in acute distress.     Appearance: Normal appearance.   HENT:      Head: Normocephalic and atraumatic.      Right Ear: External ear normal.      Left Ear: External ear normal.      Nose: Nose normal.      Mouth/Throat:      Pharynx: Oropharynx is clear.     Eyes:      Conjunctiva/sclera: Conjunctivae normal.       Cardiovascular:      Rate and Rhythm: Normal rate and regular rhythm.      Pulses: Normal pulses.      Heart sounds: Normal heart sounds.   Pulmonary:      Effort: Pulmonary " effort is normal.      Breath sounds: Normal breath sounds.   Abdominal:      Palpations: Abdomen is soft.     Musculoskeletal:      Right lower leg: No edema.      Left lower leg: No edema.     Skin:     General: Skin is warm.     Neurological:      Mental Status: He is alert and oriented to person, place, and time.     Psychiatric:         Mood and Affect: Mood normal.         Behavior: Behavior normal.                [1]   Current Outpatient Medications on File Prior to Visit   Medication Sig Dispense Refill    aspirin 81 MG tablet Take 1 tablet by mouth in the morning.      clobetasol (TEMOVATE) 0.05 % cream       Multiple Vitamin (DAILY VALUE MULTIVITAMIN) TABS Take 1 tablet by mouth in the morning.      triamcinolone (KENALOG) 0.1 % cream as needed      Journavx 50 MG TABS TAKE 2 TABLETS BY MOUTH AS ONE DOSE, THEN 1 TABLET EVERY 12 HOURS FOR 5 DAYS      oxyCODONE-acetaminophen (Percocet) 5-325 mg per tablet Take 1 tablet by mouth every 4 (four) hours as needed for moderate pain for up to 20 doses Max Daily Amount: 6 tablets 20 tablet 0     No current facility-administered medications on file prior to visit.

## 2025-07-01 NOTE — PATIENT INSTRUCTIONS
"Patient Education     Routine physical for adults   The Basics   Written by the doctors and editors at [facility]   What is a physical? -- A physical is a routine visit, or \"check-up,\" with your doctor. You might also hear it called a \"wellness visit\" or \"preventive visit.\"  During each visit, the doctor will:   Ask about your physical and mental health   Ask about your habits, behaviors, and lifestyle   Do an exam   Give you vaccines if needed   Talk to you about any medicines you take   Give advice about your health   Answer your questions  Getting regular check-ups is an important part of taking care of your health. It can help your doctor find and treat any problems you have. But it's also important for preventing health problems.  A routine physical is different from a \"sick visit.\" A sick visit is when you see a doctor because of a health concern or problem. Since physicals are scheduled ahead of time, you can think about what you want to ask the doctor.  How often should I get a physical? -- It depends on your age and health. In general, for people age 21 years and older:   If you are younger than 50 years, you might be able to get a physical every 3 years.   If you are 50 years or older, your doctor might recommend a physical every year.  If you have an ongoing health condition, like diabetes or high blood pressure, your doctor will probably want to see you more often.  What happens during a physical? -- In general, each visit will include:   Physical exam - The doctor or nurse will check your height, weight, heart rate, and blood pressure. They will also look at your eyes and ears. They will ask about how you are feeling and whether you have any symptoms that bother you.   Medicines - It's a good idea to bring a list of all the medicines you take to each doctor visit. Your doctor will talk to you about your medicines and answer any questions. Tell them if you are having any side effects that bother you. You " "should also tell them if you are having trouble paying for any of your medicines.   Habits and behaviors - This includes:   Your diet   Your exercise habits   Whether you smoke, drink alcohol, or use drugs   Whether you are sexually active   Whether you feel safe at home  Your doctor will talk to you about things you can do to improve your health and lower your risk of health problems. They will also offer help and support. For example, if you want to quit smoking, they can give you advice and might prescribe medicines. If you want to improve your diet or get more physical activity, they can help you with this, too.   Lab tests, if needed - The tests you get will depend on your age and situation. For example, your doctor might want to check your:   Cholesterol   Blood sugar   Iron level   Vaccines - The recommended vaccines will depend on your age, health, and what vaccines you already had. Vaccines are very important because they can prevent certain serious or deadly infections.   Discussion of screening - \"Screening\" means checking for diseases or other health problems before they cause symptoms. Your doctor can recommend screening based on your age, risk, and preferences. This might include tests to check for:   Cancer, such as breast, prostate, cervical, ovarian, colorectal, prostate, lung, or skin cancer   Sexually transmitted infections, such as chlamydia and gonorrhea   Mental health conditions like depression and anxiety  Your doctor will talk to you about the different types of screening tests. They can help you decide which screenings to have. They can also explain what the results might mean.   Answering questions - The physical is a good time to ask the doctor or nurse questions about your health. If needed, they can refer you to other doctors or specialists, too.  Adults older than 65 years often need other care, too. As you get older, your doctor will talk to you about:   How to prevent falling at " home   Hearing or vision tests   Memory testing   How to take your medicines safely   Making sure that you have the help and support you need at home  All topics are updated as new evidence becomes available and our peer review process is complete.  This topic retrieved from [employer] on: May 02, 2024.  Topic 476119 Version 1.0  Release: 32.4.3 - C32.122  © 2024 UpToDate, Inc. and/or its affiliates. All rights reserved.  Consumer Information Use and Disclaimer   Disclaimer: This generalized information is a limited summary of diagnosis, treatment, and/or medication information. It is not meant to be comprehensive and should be used as a tool to help the user understand and/or assess potential diagnostic and treatment options. It does NOT include all information about conditions, treatments, medications, side effects, or risks that may apply to a specific patient. It is not intended to be medical advice or a substitute for the medical advice, diagnosis, or treatment of a health care provider based on the health care provider's examination and assessment of a patient's specific and unique circumstances. Patients must speak with a health care provider for complete information about their health, medical questions, and treatment options, including any risks or benefits regarding use of medications. This information does not endorse any treatments or medications as safe, effective, or approved for treating a specific patient. UpToDate, Inc. and its affiliates disclaim any warranty or liability relating to this information or the use thereof.The use of this information is governed by the Terms of Use, available at https://www.woltersiiMondeuwer.com/en/know/clinical-effectiveness-terms. 2024© UpToDate, Inc. and its affiliates and/or licensors. All rights reserved.  Copyright   © 2024 UpToDate, Inc. and/or its affiliates. All rights reserved.

## 2025-07-09 ENCOUNTER — APPOINTMENT (OUTPATIENT)
Dept: LAB | Facility: MEDICAL CENTER | Age: 65
End: 2025-07-09
Payer: COMMERCIAL

## 2025-07-09 DIAGNOSIS — Z12.5 SCREENING FOR PROSTATE CANCER: ICD-10-CM

## 2025-07-09 LAB — PSA SERPL-MCNC: 2.31 NG/ML (ref 0–4)

## 2025-07-09 PROCEDURE — 84153 ASSAY OF PSA TOTAL: CPT

## 2025-07-09 PROCEDURE — 36415 COLL VENOUS BLD VENIPUNCTURE: CPT

## 2025-07-10 ENCOUNTER — TELEPHONE (OUTPATIENT)
Dept: FAMILY MEDICINE CLINIC | Facility: CLINIC | Age: 65
End: 2025-07-10

## 2025-07-10 NOTE — TELEPHONE ENCOUNTER
----- Message from Reyes Macias DO sent at 7/1/2025  8:40 AM EDT -----  Regarding: care gap  Patient completed Cologuard 1/2024, please close care gap

## 2025-07-11 ENCOUNTER — TELEPHONE (OUTPATIENT)
Dept: ADMINISTRATIVE | Facility: OTHER | Age: 65
End: 2025-07-11

## 2025-07-11 NOTE — TELEPHONE ENCOUNTER
----- Message from Kenia HESS sent at 7/10/2025  3:55 PM EDT -----  Regarding: Care Gap Request  07/10/25 3:55 PM    Hello, our patient above has had CRC: Cologuard completed/performed. Please assist in updating the patient chart by pulling the document from labs Tab within Chart Review. The date of service is 01/04/2024.     Thank you,  EL Byrne [facility]

## 2025-07-11 NOTE — TELEPHONE ENCOUNTER
Upon review of the In Basket request we were able to locate, review, and update the patient chart as requested for CRC: Cologuard.    Any additional questions or concerns should be emailed to the Practice Liaisons via the appropriate education email address, please do not reply via In Basket.    Thank you  Haider King MA   [facility]

## (undated) DEVICE — KIRSCHNER WIRE , L, TIPS TROCAR , SMOOTH
Type: IMPLANTABLE DEVICE | Site: FOOT | Status: NON-FUNCTIONAL
Removed: 2025-06-09

## (undated) DEVICE — CURITY NON-ADHERENT STRIPS: Brand: CURITY

## (undated) DEVICE — SUT PROLENE 4-0 PS-2 18 IN 8682G

## (undated) DEVICE — NEEDLE 25G X 1 1/2

## (undated) DEVICE — STOCKINETTE 2P PREROLLD 6X60

## (undated) DEVICE — BONE SCREW, FULLY THREADED, T8
Type: IMPLANTABLE DEVICE | Site: FOOT | Status: NON-FUNCTIONAL
Brand: VARIAX
Removed: 2025-06-09

## (undated) DEVICE — DRILL BIT, AO, SCALED: Brand: VARIAX

## (undated) DEVICE — INTENDED FOR TISSUE SEPARATION, AND OTHER PROCEDURES THAT REQUIRE A SHARP SURGICAL BLADE TO PUNCTURE OR CUT.: Brand: BARD-PARKER SAFETY BLADES SIZE 15, STERILE

## (undated) DEVICE — CHLORAPREP HI-LITE 26ML ORANGE

## (undated) DEVICE — STERILE POLYISOPRENE POWDER-FREE SURGICAL GLOVES: Brand: PROTEXIS

## (undated) DEVICE — CUFF TOURNIQUET 18 X 4 IN QUICK CONNECT DISP 1 BLADDER

## (undated) DEVICE — UNTHREADED GUIDE WIRE
Type: IMPLANTABLE DEVICE | Site: FOOT | Status: NON-FUNCTIONAL
Brand: FIXOS
Removed: 2025-06-09

## (undated) DEVICE — INTENDED FOR TISSUE SEPARATION, AND OTHER PROCEDURES THAT REQUIRE A SHARP SURGICAL BLADE TO PUNCTURE OR CUT.: Brand: BARD-PARKER ® SAFETYLOCK CARBON RIB-BACK BLADES

## (undated) DEVICE — NEPTUNE E-SEP SMOKE EVACUATION PENCIL, COATED, 70MM BLADE, PUSH BUTTON SWITCH: Brand: NEPTUNE E-SEP

## (undated) DEVICE — DISPOSABLE OR TOWEL: Brand: CARDINAL HEALTH

## (undated) DEVICE — THIN OFFSET (9.0 X 0.38 X 25.0MM)

## (undated) DEVICE — SCD SEQUENTIAL COMPRESSION COMFORT SLEEVE MEDIUM KNEE LENGTH: Brand: KENDALL SCD

## (undated) DEVICE — POV-IOD SOLUTION 4OZ BT

## (undated) DEVICE — REM POLYHESIVE ADULT PATIENT RETURN ELECTRODE: Brand: VALLEYLAB

## (undated) DEVICE — USED IN CONJUNCTION WITH A SYRINGE AS AN ADDITIVE DEVICE FOR ASPIRATION FROM MULTI-DOSE MEDICINE VIALS OR INJECTION INTO I.V. SYSTEMS AND PRE-SLIT SEPTUMS COVERING INJECTION SITES.: Brand: SOL-M™ BLUNT FILL NEEDLE

## (undated) DEVICE — BETHLEHEM UNIVERSAL  MIONR EXT: Brand: CARDINAL HEALTH

## (undated) DEVICE — CURITY STRETCH BANDAGE: Brand: CURITY

## (undated) DEVICE — HOLDING PIN
Type: IMPLANTABLE DEVICE | Site: FOOT | Status: NON-FUNCTIONAL
Brand: ANCHORAGE
Removed: 2025-06-09

## (undated) DEVICE — SINGLE PORT MANIFOLD: Brand: NEPTUNE 2

## (undated) DEVICE — CAST PADDING 4 IN SYNTHETIC NON-STRL

## (undated) DEVICE — SYRINGE 10ML LL